# Patient Record
Sex: FEMALE | Race: WHITE | NOT HISPANIC OR LATINO | Employment: FULL TIME | URBAN - METROPOLITAN AREA
[De-identification: names, ages, dates, MRNs, and addresses within clinical notes are randomized per-mention and may not be internally consistent; named-entity substitution may affect disease eponyms.]

---

## 2021-10-07 ENCOUNTER — TELEPHONE (OUTPATIENT)
Dept: RADIOLOGY | Facility: HOSPITAL | Age: 71
End: 2021-10-07

## 2021-10-08 ENCOUNTER — HOSPITAL ENCOUNTER (OUTPATIENT)
Dept: RADIOLOGY | Facility: HOSPITAL | Age: 71
Discharge: HOME | End: 2021-10-08
Attending: INTERNAL MEDICINE

## 2021-10-08 DIAGNOSIS — I25.10 CORONARY ATHEROSCLEROSIS OF NATIVE CORONARY ARTERY: ICD-10-CM

## 2021-10-08 DIAGNOSIS — R03.0 ELEVATED BLOOD PRESSURE READING WITHOUT DIAGNOSIS OF HYPERTENSION: ICD-10-CM

## 2021-10-08 PROCEDURE — 75571 CT HRT W/O DYE W/CA TEST: CPT | Mod: TC

## 2021-10-08 PROCEDURE — 75571 CT HRT W/O DYE W/CA TEST: CPT | Mod: 26,,, | Performed by: RADIOLOGY

## 2021-10-08 PROCEDURE — 75571 CT CALCIUM SCORING CARDIAC: ICD-10-PCS | Mod: 26,,, | Performed by: RADIOLOGY

## 2023-12-28 ENCOUNTER — OFFICE VISIT (OUTPATIENT)
Dept: PULMONOLOGY | Facility: CLINIC | Age: 73
End: 2023-12-28
Payer: MEDICARE

## 2023-12-28 ENCOUNTER — LAB VISIT (OUTPATIENT)
Dept: LAB | Facility: HOSPITAL | Age: 73
End: 2023-12-28
Attending: INTERNAL MEDICINE
Payer: MEDICARE

## 2023-12-28 VITALS
WEIGHT: 159.63 LBS | DIASTOLIC BLOOD PRESSURE: 80 MMHG | OXYGEN SATURATION: 97 % | HEIGHT: 67 IN | BODY MASS INDEX: 25.06 KG/M2 | RESPIRATION RATE: 16 BRPM | HEART RATE: 81 BPM | SYSTOLIC BLOOD PRESSURE: 120 MMHG

## 2023-12-28 DIAGNOSIS — Z86.19 HISTORY OF ASPERGILLOMA: ICD-10-CM

## 2023-12-28 DIAGNOSIS — B44.89 INFECTION BY ASPERGILLUS FUMIGATUS: Primary | ICD-10-CM

## 2023-12-28 DIAGNOSIS — J45.20 MILD INTERMITTENT ASTHMA WITH ALLERGIC RHINITIS WITHOUT COMPLICATION: ICD-10-CM

## 2023-12-28 DIAGNOSIS — J30.89 SEASONAL ALLERGIC RHINITIS DUE TO OTHER ALLERGIC TRIGGER: ICD-10-CM

## 2023-12-28 DIAGNOSIS — B44.9: ICD-10-CM

## 2023-12-28 DIAGNOSIS — B44.89 INFECTION BY ASPERGILLUS FUMIGATUS: ICD-10-CM

## 2023-12-28 LAB
IGA SERPL-MCNC: 217 MG/DL (ref 40–350)
IGE SERPL-ACNC: 56 IU/ML (ref 0–100)
IGG SERPL-MCNC: 944 MG/DL (ref 650–1600)
IGM SERPL-MCNC: 97 MG/DL (ref 50–300)

## 2023-12-28 PROCEDURE — 82787 IGG 1 2 3 OR 4 EACH: CPT | Mod: 59 | Performed by: INTERNAL MEDICINE

## 2023-12-28 PROCEDURE — 99205 OFFICE O/P NEW HI 60 MIN: CPT | Mod: S$PBB,,, | Performed by: INTERNAL MEDICINE

## 2023-12-28 PROCEDURE — 82784 ASSAY IGA/IGD/IGG/IGM EACH: CPT | Mod: 59 | Performed by: INTERNAL MEDICINE

## 2023-12-28 PROCEDURE — 99999 PR PBB SHADOW E&M-EST. PATIENT-LVL IV: ICD-10-PCS | Mod: PBBFAC,,, | Performed by: INTERNAL MEDICINE

## 2023-12-28 PROCEDURE — 99999 PR PBB SHADOW E&M-EST. PATIENT-LVL IV: CPT | Mod: PBBFAC,,, | Performed by: INTERNAL MEDICINE

## 2023-12-28 PROCEDURE — 82785 ASSAY OF IGE: CPT | Performed by: INTERNAL MEDICINE

## 2023-12-28 PROCEDURE — 99214 OFFICE O/P EST MOD 30 MIN: CPT | Mod: PBBFAC | Performed by: INTERNAL MEDICINE

## 2023-12-28 PROCEDURE — 82784 ASSAY IGA/IGD/IGG/IGM EACH: CPT | Performed by: INTERNAL MEDICINE

## 2023-12-28 PROCEDURE — 99205 PR OFFICE/OUTPT VISIT, NEW, LEVL V, 60-74 MIN: ICD-10-PCS | Mod: S$PBB,,, | Performed by: INTERNAL MEDICINE

## 2023-12-28 RX ORDER — TAMSULOSIN HYDROCHLORIDE 0.4 MG/1
1 CAPSULE ORAL NIGHTLY
COMMUNITY
Start: 2023-09-19

## 2023-12-28 RX ORDER — DESLORATADINE 5 MG/1
5 TABLET ORAL
COMMUNITY

## 2023-12-28 RX ORDER — MONTELUKAST SODIUM 10 MG/1
10 TABLET ORAL
COMMUNITY
Start: 2023-12-11

## 2023-12-28 NOTE — PROGRESS NOTES
Subjective:     Patient ID: Pat Damon is a 73 y.o. female.    Chief Complaint:      HPI    74 y/o with history of allergic rhinitis and asthma. Last seen over 9 years ago.  Recent 4/2023 surgery with aspergillus fungus ball in sinus removed via ENT  Noted to have tests in urine as well      Asthma Follow-up  The patient has previously been evaluated here for asthma and presents for an asthma follow-up. The patient is not currently have symptoms / an exacerbation. The patient has been having episodes for approximately  many  years. Symptoms in previous episodes have included dyspnea, non-productive cough, and wheezing, and typically last 2 weeks. Previous episodes have been triggered by no identifiable factor. Treatments tried during prior episodes include short-acting inhaled beta-adrenergic agonists, which usually provides some relief of symptoms.    Current Disease Severity  The patient is having daytime symptoms less than or equal to 2 days per week. The patient is having daytime symptoms less than or equal to 2 times per month. The patient is using short-acting beta agonists for symptom control less than or equal to 2 days per week. She has exacerbations requiring oral systemic corticosteroids 0 times per year. Current limitations in activity from asthma: none. Number of days of school or work missed in the last month: not applicable. Number of urgent/emergent visit in last year: 0.  The patient is not using a spacer with MDIs. Her best peak flow rate is chris. She is not monitoring peak flow rates at home.  Side effects to oral steroids  - prednisone medrol      Past Medical History:   Diagnosis Date    Asthma in remission     Breast cancer 2014    Essential (primary) hypertension     Hypercholesterolemia     Melanoma     Migraines     Pulsatile tinnitus     SVT (supraventricular tachycardia)      Past Surgical History:   Procedure Laterality Date    BREAST LUMPECTOMY      CHOLECYSTECTOMY      EXCISION  OF MELANOMA      TONSILLECTOMY      TURBINATE RESECTION       Review of patient's allergies indicates:   Allergen Reactions    Molds extract     Phenytoin sodium extended      Other reaction(s): tachycardia  Other reaction(s): tachycardia    Pollens extract      Current Outpatient Medications on File Prior to Visit   Medication Sig Dispense Refill    ascorbic acid, vitamin C, (VITAMIN C) 1000 MG tablet Take 1,000 mg by mouth.      aspirin (ECOTRIN) 81 MG EC tablet Take 1 tablet by mouth every morning.      butalbital-acetaminophen-caff -40 mg Cap       desloratadine (CLARINEX) 5 mg tablet Take 5 mg by mouth.      famotidine (PEPCID) 40 MG tablet Take 40 mg by mouth 2 (two) times daily.      metoprolol tartrate (LOPRESSOR) 25 MG tablet       montelukast (SINGULAIR) 10 mg tablet Take 10 mg by mouth.      multivitamin with folic acid 400 mcg Tab Take 1 tablet by mouth once daily.      omega 3-dha-epa-fish oil (OMEGA-3) 350 mg-235 mg- 90 mg-597 mg CpDR       phytonadione, vit K1, (PHYTONADIONE, VITAMIN K1,) 100 mcg Tab Take 100 mcg by mouth.      rosuvastatin (CRESTOR) 5 MG tablet       tamsulosin (FLOMAX) 0.4 mg Cap Take 1 capsule by mouth every evening.      ZENPEP 40,000-126,000- 168,000 unit CpDR SMARTSI Capsule(s) By Mouth      [DISCONTINUED] omeprazole (PRILOSEC) 20 MG capsule Take 20 mg by mouth.       No current facility-administered medications on file prior to visit.     Social History     Socioeconomic History    Marital status: Single   Tobacco Use    Smoking status: Never    Smokeless tobacco: Never   Substance and Sexual Activity    Alcohol use: Not Currently     Alcohol/week: 1.0 standard drink of alcohol     Types: 1 Glasses of wine per week    Drug use: No    Sexual activity: Not Currently     Family History   Problem Relation Age of Onset    Hypertension Mother     Heart disease Mother        Review of Systems   Constitutional:  Negative for fever and fatigue.   HENT:  Positive for postnasal  "drip and congestion. Negative for rhinorrhea.    Eyes:  Negative for redness and itching.   Respiratory:  Negative for cough, shortness of breath, wheezing, dyspnea on extertion and Paroxysmal Nocturnal Dyspnea.    Cardiovascular:  Negative for chest pain.   Genitourinary:  Negative for difficulty urinating and hematuria.   Endocrine:  Negative for polyphagia, cold intolerance and heat intolerance.    Musculoskeletal:  Negative for arthralgias.   Skin:  Negative for rash.   Gastrointestinal:  Negative for nausea, vomiting, abdominal pain and abdominal distention.   Neurological:  Negative for dizziness and headaches.   Hematological:  Negative for adenopathy. Does not bruise/bleed easily and no excessive bruising.   Psychiatric/Behavioral:  The patient is not nervous/anxious.        Objective:      /80   Pulse 81   Resp 16   Ht 5' 7" (1.702 m)   Wt 72.4 kg (159 lb 9.8 oz)   SpO2 97%   BMI 25.00 kg/m²   Physical Exam  Vitals and nursing note reviewed.   Constitutional:       Appearance: Normal appearance. She is well-developed.   HENT:      Head: Normocephalic and atraumatic.      Nose: Nose normal.   Eyes:      Conjunctiva/sclera: Conjunctivae normal.      Pupils: Pupils are equal, round, and reactive to light.   Neck:      Thyroid: No thyromegaly.      Vascular: No JVD.      Trachea: No tracheal deviation.   Cardiovascular:      Rate and Rhythm: Normal rate and regular rhythm.      Heart sounds: Normal heart sounds.   Pulmonary:      Effort: Pulmonary effort is normal. No respiratory distress.      Breath sounds: Normal breath sounds. No wheezing or rales.   Chest:      Chest wall: No tenderness.   Abdominal:      General: Bowel sounds are normal.      Palpations: Abdomen is soft.   Musculoskeletal:         General: Normal range of motion.      Cervical back: Neck supple.   Lymphadenopathy:      Cervical: No cervical adenopathy.   Skin:     General: Skin is warm and dry.   Neurological:      Mental " "Status: She is alert and oriented to person, place, and time.       Personal Diagnostic Review  none pertinent        12/28/2023     2:43 PM   Pulmonary Studies Review   SpO2 97 %   Height 5' 7" (1.702 m)   Weight 72.4 kg (159 lb 9.8 oz)   BMI (Calculated) 25   Predicted Distance 296.41   Predicted Distance Meters (Calculated) 438.39 meters       CT Cardiac Scoring  Narrative: EXAMINATION:  CT CALCIUM SCORING CARDIAC    CLINICAL HISTORY:  Atherosclerotic heart disease of native coronary artery without angina pectoris    TECHNIQUE:  Gated,  low dose axial images were performed over the heart.    COMPARISON:  Chest x-ray from 01/08/2014    FINDINGS:  Important information about your scan:    The following information is based on analysis of the coronary arteries only.  Calcium deposits do not correspond directly to the percentage of narrowing of the arteries.  They do correlate directly to the amount of coronary plaque, and to the risk of future coronary disease.  These calcium deposits usually begin to form years before any symptoms develop.  Early detection and modification of risk factors, such as smoking and cholesterol intake, and slow progression of coronary artery disease.    A low score suggests a low likelihood of coronary artery disease, but does not exclude the possibility of significant coronary artery narrowing.  The results should be discussed with your physician taking into account other risk factors such as age, gender, family history, diabetes, smoking or high cholesterol levels.    Should you experience chest pain, difficulty breathing, discomfort radiating into her neck or arm, or discomfort combined with lightheadedness, sweating, fainting or nausea, you should seek prompt medical attention.    Calcium score:    0-10: Very little coronary heart disease risk    11 through 100: Low to moderate coronary heart disease risk    101 through 400: Moderate to high coronary heart disease risk    Greater than " "401: High coronary heart disease risk.    SCORE SUMMARY    Your total calcium score is 190.  This places the patient in the 60th percentile rank.  This means 40% of females between 71 and 75 will have a higher calcium score.    Incidental findings: Partially imaged bilateral breast implants.  Calcified plaque in the thoracic and abdominal aorta.  Degenerative changes of the spine.  Impression: Your total calcium score is 190.  Incidental findings as above.    Electronically signed by: Mann Grimm MD  Date:    10/08/2021  Time:    15:13      Office Spirometry Results:         12/28/2023     2:43 PM 3/20/2023     2:59 PM 9/26/2022     3:37 PM 5/12/2022     3:37 PM 12/16/2021     2:11 PM 1/8/2014    12:06 PM 12/13/2012     9:25 AM   Pulmonary Function Tests   SpO2 97 %     98 %    Height 5' 7" (1.702 m) 5' 7" (1.702 m) 5' 7" (1.702 m) 5' 7" (1.702 m) 5' 7" (1.702 m) 5' 7" (1.702 m) 5' 7" (1.702 m)   Weight 72.4 kg (159 lb 9.8 oz) 71.7 kg (158 lb) 72.6 kg (160 lb) 73.5 kg (162 lb) 71.7 kg (158 lb) 70.5 kg (155 lb 8 oz) 75.8 kg (167 lb 1.6 oz)   BMI (Calculated) 25 24.7 25.1 25.4 24.7 24.4 26.2         12/28/2023     2:43 PM   Pulmonary Studies Review   SpO2 97 %   Height 5' 7" (1.702 m)   Weight 72.4 kg (159 lb 9.8 oz)   BMI (Calculated) 25   Predicted Distance 296.41   Predicted Distance Meters (Calculated) 438.39 meters         Assessment:            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  -     Aspergillus Antigen; Future; Expected date: 12/28/2023  -     Fungal Precipitins (Hypersensitivity PneumonitisI); Future; Expected date: 12/28/2023  -     CT Chest Without Contrast; Future; Expected date: 12/28/2023  -     IgG 1, 2, 3, and 4; Future; Expected date: 12/28/2023  -     IgE; Future; Expected date: 12/28/2023  -     IGA; Future; Expected date: 12/28/2023  -     IgG; Future; Expected date: 12/28/2023  -     IgM; Future; Expected date: 12/28/2023  -     Fungitell Assay For (1.3)-B-D-Glucans; Future; Expected date: " 2023    Intracavitary aspergillus fungus ball    History of aspergilloma - sinus cavity  -     Aspergillus Antigen; Future; Expected date: 2023  -     Fungal Precipitins (Hypersensitivity PneumonitisI); Future; Expected date: 2023  -     Fungitell Assay For (1.3)-B-D-Glucans; Future; Expected date: 2023    Seasonal allergic rhinitis due to other allergic trigger    Mild intermittent asthma with allergic rhinitis without complication          Outpatient Encounter Medications as of 2023   Medication Sig Dispense Refill    ascorbic acid, vitamin C, (VITAMIN C) 1000 MG tablet Take 1,000 mg by mouth.      aspirin (ECOTRIN) 81 MG EC tablet Take 1 tablet by mouth every morning.      butalbital-acetaminophen-caff -40 mg Cap       desloratadine (CLARINEX) 5 mg tablet Take 5 mg by mouth.      famotidine (PEPCID) 40 MG tablet Take 40 mg by mouth 2 (two) times daily.      metoprolol tartrate (LOPRESSOR) 25 MG tablet       montelukast (SINGULAIR) 10 mg tablet Take 10 mg by mouth.      multivitamin with folic acid 400 mcg Tab Take 1 tablet by mouth once daily.      omega 3-dha-epa-fish oil (OMEGA-3) 350 mg-235 mg- 90 mg-597 mg CpDR       phytonadione, vit K1, (PHYTONADIONE, VITAMIN K1,) 100 mcg Tab Take 100 mcg by mouth.      rosuvastatin (CRESTOR) 5 MG tablet       tamsulosin (FLOMAX) 0.4 mg Cap Take 1 capsule by mouth every evening.      ZENPEP 40,000-126,000- 168,000 unit CpDR SMARTSI Capsule(s) By Mouth      [DISCONTINUED] omeprazole (PRILOSEC) 20 MG capsule Take 20 mg by mouth.       No facility-administered encounter medications on file as of 2023.     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    MEDICAL DECISION MAKING: Moderate to high complexity.  Overall, the multiple problems listed are of moderate to high severity that may impact quality of life and activities of daily living. Side effects of medications, treatment plan as well as options and alternatives reviewed and discussed with patient. There was counseling of patient concerning these issues.    Total time spent in counseling and coordination of care - 45  minutes of total time spent on the encounter, which includes face to face time and non-face to face time preparing to see the patient (eg, review of tests), Obtaining and/or reviewing separately obtained history, Documenting clinical information in the electronic or other health record, Independently interpreting results (not separately reported) and communicating results to the patient/family/caregiver, or Care coordination (not separately reported).    Time was used in discussion of prognosis, risks, benefits of treatment, instructions and compliance with regimen . Discussion with other physicians and/or health care providers - home health or for use of durable medical equipment (oxygen, nebulizers, CPAP, BiPAP) occurred.

## 2024-01-01 LAB
1,3 BETA GLUCAN SER-MCNC: <31 PG/ML
FUNGITELL COMMENTS: NEGATIVE

## 2024-01-02 LAB
GALACTOMANNAN AG SERPL IA-ACNC: <0.5 INDEX
IGG1 SER-MCNC: 448 MG/DL (ref 382–929)
IGG2 SER-MCNC: 377 MG/DL (ref 242–700)
IGG3 SER-MCNC: 30 MG/DL (ref 22–176)
IGG4 SER-MCNC: 55 MG/DL (ref 4–86)

## 2024-01-08 ENCOUNTER — TELEPHONE (OUTPATIENT)
Dept: PULMONOLOGY | Facility: CLINIC | Age: 74
End: 2024-01-08
Payer: MEDICARE

## 2024-01-08 NOTE — TELEPHONE ENCOUNTER
Spoke to pt.  She is concerned about having more radiation from a CT scan since she recently received radiation for breast cancer unless you feel it is necessary.  Please advise.

## 2024-01-08 NOTE — TELEPHONE ENCOUNTER
----- Message from Jerry Orta sent at 1/8/2024 12:29 PM CST -----  Contact: patient  Pat Damon would like a call back at [phone], in regards to checking to see if her Lung CT scan is still needed since all of her labs came back normal.

## 2024-01-10 ENCOUNTER — PATIENT MESSAGE (OUTPATIENT)
Dept: PULMONOLOGY | Facility: CLINIC | Age: 74
End: 2024-01-10
Payer: MEDICARE

## 2024-01-11 NOTE — TELEPHONE ENCOUNTER
Called patient  No pulmonary symptoms  Serologic workup negative  No need for CT of chest  Follow up as needed     Latest Reference Range & Units 12/28/23 15:56   Aspergillus Antigen <0.5 index <0.500   Fungitell Assay <60 pg/mL pg/mL <31   Fungitell Comments Negative  Negative        Latest Reference Range & Units 12/28/23 15:56   IgG 650 - 1600 mg/dL 944   IgM 50 - 300 mg/dL 97   IgA 40 - 350 mg/dL 217   IgE 0 - 100 IU/mL 56   IgG 1 382 - 929 mg/dL 448   IgG 2 242 - 700 mg/dL 377   IgG 3 22 - 176 mg/dL 30   IgG 4 4 - 86 mg/dL 55

## 2024-04-18 ENCOUNTER — LAB VISIT (OUTPATIENT)
Dept: LAB | Facility: HOSPITAL | Age: 74
End: 2024-04-18
Attending: STUDENT IN AN ORGANIZED HEALTH CARE EDUCATION/TRAINING PROGRAM
Payer: MEDICARE

## 2024-04-18 ENCOUNTER — OFFICE VISIT (OUTPATIENT)
Dept: ALLERGY | Facility: CLINIC | Age: 74
End: 2024-04-18
Payer: MEDICARE

## 2024-04-18 VITALS
BODY MASS INDEX: 24.47 KG/M2 | DIASTOLIC BLOOD PRESSURE: 81 MMHG | HEIGHT: 67 IN | HEART RATE: 71 BPM | WEIGHT: 155.88 LBS | TEMPERATURE: 98 F | SYSTOLIC BLOOD PRESSURE: 129 MMHG | OXYGEN SATURATION: 98 %

## 2024-04-18 DIAGNOSIS — R23.2 FLUSHING: ICD-10-CM

## 2024-04-18 DIAGNOSIS — R14.0 ABDOMINAL BLOATING: ICD-10-CM

## 2024-04-18 DIAGNOSIS — J45.20 MILD INTERMITTENT ASTHMA WITHOUT COMPLICATION: ICD-10-CM

## 2024-04-18 DIAGNOSIS — D89.40 MAST CELL ACTIVATION: ICD-10-CM

## 2024-04-18 DIAGNOSIS — J30.9 CHRONIC ALLERGIC RHINITIS: Primary | ICD-10-CM

## 2024-04-18 LAB
BASOPHILS # BLD AUTO: 0.06 K/UL (ref 0–0.2)
BASOPHILS NFR BLD: 0.9 % (ref 0–1.9)
DIFFERENTIAL METHOD BLD: ABNORMAL
EOSINOPHIL # BLD AUTO: 0 K/UL (ref 0–0.5)
EOSINOPHIL NFR BLD: 0.6 % (ref 0–8)
ERYTHROCYTE [DISTWIDTH] IN BLOOD BY AUTOMATED COUNT: 12.3 % (ref 11.5–14.5)
HCT VFR BLD AUTO: 43.7 % (ref 37–48.5)
HGB BLD-MCNC: 13.4 G/DL (ref 12–16)
IMM GRANULOCYTES # BLD AUTO: 0.01 K/UL (ref 0–0.04)
IMM GRANULOCYTES NFR BLD AUTO: 0.1 % (ref 0–0.5)
LYMPHOCYTES # BLD AUTO: 2.1 K/UL (ref 1–4.8)
LYMPHOCYTES NFR BLD: 30.3 % (ref 18–48)
MCH RBC QN AUTO: 30.6 PG (ref 27–31)
MCHC RBC AUTO-ENTMCNC: 30.7 G/DL (ref 32–36)
MCV RBC AUTO: 100 FL (ref 82–98)
MONOCYTES # BLD AUTO: 0.9 K/UL (ref 0.3–1)
MONOCYTES NFR BLD: 13.1 % (ref 4–15)
NEUTROPHILS # BLD AUTO: 3.9 K/UL (ref 1.8–7.7)
NEUTROPHILS NFR BLD: 55 % (ref 38–73)
NRBC BLD-RTO: 0 /100 WBC
PLATELET # BLD AUTO: 229 K/UL (ref 150–450)
PMV BLD AUTO: 10.1 FL (ref 9.2–12.9)
RBC # BLD AUTO: 4.38 M/UL (ref 4–5.4)
WBC # BLD AUTO: 7.02 K/UL (ref 3.9–12.7)

## 2024-04-18 PROCEDURE — 99213 OFFICE O/P EST LOW 20 MIN: CPT | Mod: PBBFAC | Performed by: STUDENT IN AN ORGANIZED HEALTH CARE EDUCATION/TRAINING PROGRAM

## 2024-04-18 PROCEDURE — 85025 COMPLETE CBC W/AUTO DIFF WBC: CPT | Performed by: STUDENT IN AN ORGANIZED HEALTH CARE EDUCATION/TRAINING PROGRAM

## 2024-04-18 PROCEDURE — 99204 OFFICE O/P NEW MOD 45 MIN: CPT | Mod: S$PBB,,, | Performed by: STUDENT IN AN ORGANIZED HEALTH CARE EDUCATION/TRAINING PROGRAM

## 2024-04-18 PROCEDURE — 36415 COLL VENOUS BLD VENIPUNCTURE: CPT | Performed by: STUDENT IN AN ORGANIZED HEALTH CARE EDUCATION/TRAINING PROGRAM

## 2024-04-18 PROCEDURE — 99999 PR PBB SHADOW E&M-EST. PATIENT-LVL III: CPT | Mod: PBBFAC,,, | Performed by: STUDENT IN AN ORGANIZED HEALTH CARE EDUCATION/TRAINING PROGRAM

## 2024-04-18 PROCEDURE — 82785 ASSAY OF IGE: CPT | Performed by: STUDENT IN AN ORGANIZED HEALTH CARE EDUCATION/TRAINING PROGRAM

## 2024-04-18 RX ORDER — MONTELUKAST SODIUM 10 MG/1
10 TABLET ORAL DAILY
Qty: 90 TABLET | Refills: 3 | Status: SHIPPED | OUTPATIENT
Start: 2024-04-18 | End: 2025-04-18

## 2024-04-18 NOTE — ASSESSMENT & PLAN NOTE
- Well controlled, no acute complaints  - Adding Singular 10 mg nightly at this time   - Educated on proper use of inhalers including an in-person demonstration of proper technique  - Expressed understanding of demonstrated technique  - ED precautions discussed at length   - Will continue to monitor and reassess

## 2024-04-18 NOTE — ASSESSMENT & PLAN NOTE
- Patient presenting for rule out given flushing and abdominal issues   - Ordering Tryptase and D816V mutation   - Recommended low FODMAP diet at this time   - Will continue to monitor and reassess

## 2024-04-18 NOTE — PROGRESS NOTES
Allergy and Immunology  New Patient Clinic Note    Date: 4/18/2024  Chief Complaint   Patient presents with    Allergies     Referred by: Self, Aaareferral  No address on file    History  Pat Damon is a 73 y.o. female being seen as a New Patient today.    Chronic Allergic Rhinitis   - Onset: At least early adulthood   - Symptoms: Congestion, rhinorrhea, PND, congestion   - Suspected triggers include: Serum IgE positive to cat and dust mites on OSH labs  - Prior AIT in the past   - Pattern: Perennial   - Medications: Supplements and Clarinex     Chronic or Inducible Urticaria  - No hx of chronic urticaria     Mild Intermittent Asthma   - Well controlled with no acute issues     CRSwNP  - No hx of CRSwNP - no hx of polyps   - Patient with hx of fungal ball in sinus   - Hx of surgical resection of aspergillus fungal ball     Eczema   - No hx of eczema     Eosinophilic Esophagitis  - No hx of eosinophilic esophagitis     Food Allergy  - No hx of food allergy  - Low serum IgE positive to sesame seed but no hx of anaphylaxis   - Patient reported eating hummus     Recurrent Infections  - No hx of recurrent infections     Venom Allergy  - No hx of venom allergy     Allergies, PMH, PSH, Social, and Family History were reviewed.    Review of patient's allergies indicates:   Allergen Reactions    Molds extract     Phenytoin sodium extended      Other reaction(s): tachycardia  Other reaction(s): tachycardia    Pollens extract       Past Medical History:   Diagnosis Date    Asthma in remission     Breast cancer 2014    Essential (primary) hypertension     Hypercholesterolemia     Melanoma     Migraines     Pulsatile tinnitus     SVT (supraventricular tachycardia)      Past Surgical History:   Procedure Laterality Date    BREAST LUMPECTOMY      CHOLECYSTECTOMY      EXCISION OF MELANOMA      TONSILLECTOMY      TURBINATE RESECTION       Social History     Social History Narrative    Not on file     S/he reports that she  has never smoked. She has never used smokeless tobacco. She reports that she does not currently use alcohol after a past usage of about 1.0 standard drink of alcohol per week. She reports that she does not use drugs.    Current Outpatient Medications on File Prior to Visit   Medication Sig Dispense Refill    ascorbic acid, vitamin C, (VITAMIN C) 1000 MG tablet Take 1,000 mg by mouth.      aspirin (ECOTRIN) 81 MG EC tablet Take 1 tablet by mouth every morning.      butalbital-acetaminophen-caff -40 mg Cap       desloratadine (CLARINEX) 5 mg tablet Take 5 mg by mouth.      famotidine (PEPCID) 40 MG tablet Take 40 mg by mouth 2 (two) times daily.      metoprolol tartrate (LOPRESSOR) 25 MG tablet       multivitamin with folic acid 400 mcg Tab Take 1 tablet by mouth once daily.      omega 3-dha-epa-fish oil (OMEGA-3) 350 mg-235 mg- 90 mg-597 mg CpDR       phytonadione, vit K1, (PHYTONADIONE, VITAMIN K1,) 100 mcg Tab Take 100 mcg by mouth.      rosuvastatin (CRESTOR) 5 MG tablet       tamsulosin (FLOMAX) 0.4 mg Cap Take 1 capsule by mouth every evening.      ZENPEP 40,000-126,000- 168,000 unit CpDR SMARTSI Capsule(s) By Mouth      [DISCONTINUED] montelukast (SINGULAIR) 10 mg tablet Take 10 mg by mouth.       No current facility-administered medications on file prior to visit.     Physical Examination  Vitals:    24 1518   BP: 129/81   Pulse: 71   Temp: 97.9 °F (36.6 °C)     GENERAL:  female in no apparent distress and well developed and well nourished  HEAD:  Normocephalic, without obvious abnormality, atraumatic  EYES: sclera anicteric, conjunctiva normochromic  EARS: normal TM's and external ear canals both ears  NOSE: without erythema or discharge, clear discharge, turbinates normal    OROPHARYNX: moist mucous membranes without erythema, exudates or petechiae  LYMPH NODES: normal, supple, no lymphadenopathy  LUNGS: clear to auscultation, no wheezes, rales or rhonchi, symmetric air entry.  HEART: normal  rate, regular rhythm, normal S1, S2, no murmurs, rubs, clicks or gallops.  ABDOMEN: soft, nontender, nondistended, no masses or organomegaly.  MUSCULOSKELETAL: no gross joint deformity or swelling.  NEURO: alert, oriented, normal speech, no focal findings or movement disorder noted.  SKIN: normal coloration and turgor, no rashes, no suspicious skin lesions noted.     Assessment/Plan:   Problem List Items Addressed This Visit          ENT    Chronic allergic rhinitis - Primary       Pulmonary    Mild intermittent asthma without complication    Current Assessment & Plan     - Well controlled, no acute complaints  - Adding Singular 10 mg nightly at this time   - Educated on proper use of inhalers including an in-person demonstration of proper technique  - Expressed understanding of demonstrated technique  - ED precautions discussed at length   - Will continue to monitor and reassess            Immunology/Multi System    Mast cell activation    Current Assessment & Plan     - Patient presenting for rule out given flushing and abdominal issues   - Ordering Tryptase and D816V mutation   - Recommended low FODMAP diet at this time   - Will continue to monitor and reassess          Relevant Orders    Tryptase    KIT Dbf369D Mutation Analysis, Blood    CBC Auto Differential    Allergen Profile, Zone 6       GI    Abdominal bloating       Other    Flushing     Follow up:  Follow up in about 6 weeks (around 5/30/2024).    Marjorie Dick MD   [facility]  Allergy and Immunology

## 2024-04-23 LAB
A ALTERNATA IGE QN: <0.1 KU/L
A FUMIGATUS IGE QN: <0.1 KU/L
ALLERGEN BOXELDER MAPLE TREE IGE: <0.1 KU/L
ALLERGEN MAPLE (BOX ELDER) CLASS: ABNORMAL
ALLERGEN MULBERRY CLASS: ABNORMAL
ALLERGEN MULBERRY TREE IGE: <0.1 KU/L
ALLERGEN PIGWEED IGE: <0.1 KU/L
ALLERGEN WALNUT TREE IGE: <0.1 KU/L
BERMUDA GRASS IGE QN: <0.1 KU/L
C HERBARUM IGE QN: <0.1 KU/L
CAT DANDER IGE QN: 1.25 KU/L
COMMON PIGWEED CLASS: ABNORMAL
COMMON RAGWEED IGE QN: <0.1 KU/L
D FARINAE IGE QN: 0.12 KU/L
D PTERONYSS IGE QN: 0.14 KU/L
DEPRECATED A ALTERNATA IGE RAST QL: ABNORMAL
DEPRECATED A FUMIGATUS IGE RAST QL: ABNORMAL
DEPRECATED BERMUDA GRASS IGE RAST QL: ABNORMAL
DEPRECATED C HERBARUM IGE RAST QL: ABNORMAL
DEPRECATED CAT DANDER IGE RAST QL: ABNORMAL
DEPRECATED COMMON RAGWEED IGE RAST QL: ABNORMAL
DEPRECATED D FARINAE IGE RAST QL: ABNORMAL
DEPRECATED D PTERONYSS IGE RAST QL: ABNORMAL
DEPRECATED DOG DANDER IGE RAST QL: ABNORMAL
DEPRECATED ELDER IGE RAST QL: ABNORMAL
DEPRECATED MOUSE URINE PROT IGE RAST QL: ABNORMAL
DEPRECATED MT JUNIPER IGE RAST QL: ABNORMAL
DEPRECATED P NOTATUM IGE RAST QL: ABNORMAL
DEPRECATED PECAN/HICK TREE IGE RAST QL: ABNORMAL
DEPRECATED ROACH IGE RAST QL: ABNORMAL
DEPRECATED SILVER BIRCH IGE RAST QL: ABNORMAL
DEPRECATED TIMOTHY IGE RAST QL: ABNORMAL
DEPRECATED WHITE ELM IGE RAST QL: ABNORMAL
DEPRECATED WHITE OAK IGE RAST QL: ABNORMAL
DOG DANDER IGE QN: 0.1 KU/L
ELDER IGE QN: <0.1 KU/L
IGE: 13.9 IU/ML
MOUSE URINE PROT IGE QN: <0.1 KU/L
MT JUNIPER IGE QN: <0.1 KU/L
P NOTATUM IGE QN: <0.1 KU/L
PECAN/HICK TREE IGE QN: <0.1 KU/L
RAST ALLERGEN INTERPRETATION: ABNORMAL
ROACH IGE QN: <0.1 KU/L
SILVER BIRCH IGE QN: <0.1 KU/L
TIMOTHY IGE QN: <0.1 KU/L
TRYPTASE LEVEL: 2.8 NG/ML
WALNUT TREE CLASS: ABNORMAL
WHITE ELM IGE QN: <0.1 KU/L
WHITE OAK IGE QN: <0.1 KU/L

## 2024-04-25 LAB
GENETICIST REVIEW: NORMAL
PATHOLOGIST NAME: NORMAL
SPECIMEN SOURCE: NORMAL

## 2024-04-29 ENCOUNTER — TELEPHONE (OUTPATIENT)
Dept: HEPATOLOGY | Facility: CLINIC | Age: 74
End: 2024-04-29
Payer: MEDICARE

## 2024-04-29 NOTE — TELEPHONE ENCOUNTER
Attempted to contact patient at [phone] with no answer. Left voicemail message for patient to return call.

## 2024-04-29 NOTE — TELEPHONE ENCOUNTER
----- Message from Rena Alicea sent at 4/29/2024 11:15 AM CDT -----  Contact: self  Type:  Patient Returning Call    Who Called: Pat Damon  Who Left Message for Patient:Esperanza  Does the patient know what this is regarding?:scheduling  Would the patient rather a call back or a response via [facility]? Call back  Best Call Back Number:[phone] (home)   Additional Information: n/a

## 2024-04-29 NOTE — TELEPHONE ENCOUNTER
----- Message from Chantelle Pina sent at 4/26/2024  3:27 PM CDT -----  Contact: Pat Suh is calling to receive a call back at [phone], regarding scheduling. Reports being diagnosed with fatty liver disease and would like to establish with hepatology.     No appts populated on epic

## 2024-04-29 NOTE — TELEPHONE ENCOUNTER
Spoke with patient and scheduled an appointment for 08/12/2024 with Dr. Dianna Platt at the [facility]. Patient to have additional records faxed to our office.

## 2024-06-07 ENCOUNTER — TELEPHONE (OUTPATIENT)
Dept: ALLERGY | Facility: CLINIC | Age: 74
End: 2024-06-07
Payer: MEDICARE

## 2024-06-07 ENCOUNTER — OFFICE VISIT (OUTPATIENT)
Dept: ALLERGY | Facility: CLINIC | Age: 74
End: 2024-06-07
Payer: MEDICARE

## 2024-06-07 VITALS
SYSTOLIC BLOOD PRESSURE: 107 MMHG | HEIGHT: 67 IN | BODY MASS INDEX: 24.85 KG/M2 | WEIGHT: 158.31 LBS | HEART RATE: 64 BPM | DIASTOLIC BLOOD PRESSURE: 66 MMHG

## 2024-06-07 DIAGNOSIS — J30.89 ALLERGIC RHINITIS DUE TO DUST MITE: Primary | ICD-10-CM

## 2024-06-07 DIAGNOSIS — D89.40 MAST CELL ACTIVATION: ICD-10-CM

## 2024-06-07 DIAGNOSIS — J45.20 MILD INTERMITTENT ASTHMA WITHOUT COMPLICATION: ICD-10-CM

## 2024-06-07 DIAGNOSIS — R14.0 ABDOMINAL BLOATING: ICD-10-CM

## 2024-06-07 DIAGNOSIS — J30.81 ALLERGIC RHINITIS DUE TO ANIMAL DANDER: ICD-10-CM

## 2024-06-07 PROBLEM — J30.9 CHRONIC ALLERGIC RHINITIS: Status: RESOLVED | Noted: 2024-04-18 | Resolved: 2024-06-07

## 2024-06-07 PROCEDURE — 99214 OFFICE O/P EST MOD 30 MIN: CPT | Mod: S$PBB,,, | Performed by: STUDENT IN AN ORGANIZED HEALTH CARE EDUCATION/TRAINING PROGRAM

## 2024-06-07 PROCEDURE — 99213 OFFICE O/P EST LOW 20 MIN: CPT | Mod: PBBFAC | Performed by: STUDENT IN AN ORGANIZED HEALTH CARE EDUCATION/TRAINING PROGRAM

## 2024-06-07 PROCEDURE — 99999 PR PBB SHADOW E&M-EST. PATIENT-LVL III: CPT | Mod: PBBFAC,,, | Performed by: STUDENT IN AN ORGANIZED HEALTH CARE EDUCATION/TRAINING PROGRAM

## 2024-06-07 PROCEDURE — G2211 COMPLEX E/M VISIT ADD ON: HCPCS | Mod: S$PBB,,, | Performed by: STUDENT IN AN ORGANIZED HEALTH CARE EDUCATION/TRAINING PROGRAM

## 2024-06-07 NOTE — TELEPHONE ENCOUNTER
----- Message from Petty Machuca MA sent at 6/7/2024  1:55 PM CDT -----    ----- Message -----  From: Kristina Nieto  Sent: 6/7/2024   1:53 PM CDT  To: [facility] Staff    Patient would like to know if there is an earlier time slot. Call back number is [phone]. Thx.EL

## 2024-06-07 NOTE — PROGRESS NOTES
Allergy and Immunology  Established Patient Clinic Note    Date: 6/7/2024  Chief Complaint   Patient presents with    Allergies     History  Pat Damon is a 73 y.o. female being seen for follow-up today.    Chronic Allergic Rhinitis due to cat and dust mites  Mast Cell Activation    - 1x attack when helping son pack his house   - Son has 2 cats in his house   - Patient has 1 cat in her house - cat on Fel d 1 lowering food as recommended   - Continue current medications  - Normal tryptase, no D816V mutation - reassurance at this time   - Patient appears have benefit from low histamine diet and to continue   - Patient with plan to switch from Clarinex to Allegra       CRSwNP  - No hx of CRSwNP - no hx of polyps   - Patient with hx of fungal ball in sinus   - Hx of surgical resection of aspergillus fungal ball      Food Allergy  - No hx of food allergy  - Low serum IgE positive to sesame seed but no hx of anaphylaxis   - Patient reported eating hummus    Allergies, PMH, PSH, Social, and Family History were reviewed.    Current Outpatient Medications on File Prior to Visit   Medication Sig Dispense Refill    ascorbic acid, vitamin C, (VITAMIN C) 1000 MG tablet Take 1,000 mg by mouth.      aspirin (ECOTRIN) 81 MG EC tablet Take 1 tablet by mouth every morning.      butalbital-acetaminophen-caff -40 mg Cap       desloratadine (CLARINEX) 5 mg tablet Take 5 mg by mouth.      famotidine (PEPCID) 40 MG tablet Take 40 mg by mouth 2 (two) times daily.      metoprolol tartrate (LOPRESSOR) 25 MG tablet       montelukast (SINGULAIR) 10 mg tablet Take 1 tablet (10 mg total) by mouth once daily. 90 tablet 3    multivitamin with folic acid 400 mcg Tab Take 1 tablet by mouth once daily.      omega 3-dha-epa-fish oil (OMEGA-3) 350 mg-235 mg- 90 mg-597 mg CpDR       phytonadione, vit K1, (PHYTONADIONE, VITAMIN K1,) 100 mcg Tab Take 100 mcg by mouth.      rosuvastatin (CRESTOR) 5 MG tablet       tamsulosin (FLOMAX) 0.4 mg  Cap Take 1 capsule by mouth every evening.      ZENPEP 40,000-126,000- 168,000 unit CpDR SMARTSI Capsule(s) By Mouth       No current facility-administered medications on file prior to visit.     Physical Examination  Vitals:    24 1520   BP: 107/66   Pulse: 64     GENERAL:  female in no apparent distress and well developed and well nourished  HEAD:  Normocephalic, without obvious abnormality, atraumatic  EYES: sclera anicteric, conjunctiva normochromic  EARS: normal TM's and external ear canals both ears  NOSE: without erythema or discharge, clear discharge, turbinates normal    OROPHARYNX: moist mucous membranes without erythema, exudates or petechiae  LYMPH NODES: normal, supple, no lymphadenopathy  LUNGS: clear to auscultation, no wheezes, rales or rhonchi, symmetric air entry.  HEART: normal rate, regular rhythm, normal S1, S2, no murmurs, rubs, clicks or gallops.  ABDOMEN: soft, nontender, nondistended, no masses or organomegaly.  MUSCULOSKELETAL: no gross joint deformity or swelling.  NEURO: alert, oriented, normal speech, no focal findings or movement disorder noted.  SKIN: normal coloration and turgor, no rashes, no suspicious skin lesions noted.     Assessment/Plan:   Problem List Items Addressed This Visit          ENT    Allergic rhinitis due to dust mite - Primary    Overview     - 2024: Serum IgE to Zone 6 Aeroallergens positive to cat and dust mites         Allergic rhinitis due to animal dander    Overview     - 2024: Serum IgE to Zone 6 Aeroallergens positive to cat and dust mites         Current Assessment & Plan     - 1x flare from cats   - Switching to Allegra at this time   - Considering AIT - would be a great candidate   - Will continue to monitor and reassess             Pulmonary    Mild intermittent asthma without complication       Immunology/Multi System    Mast cell activation    Overview     - Normal tryptase level; no D816V mutation          Current Assessment &  Plan     - Switching from Clarinex to Allegra   - Continue low histamine diet at this time             GI    Abdominal bloating     Follow up:  Follow up in about 2 months (around 8/7/2024).    Visit today included increased complexity associated with the care of the episodic problem rhinitis\ addressed and managing the longitudinal care of the patient due to the serious and/or complex managed problem(s) chronic allergic rhinitis, mast cell disorder, and GI issues.      Marjorie Dick MD   [facility]  Allergy and Immunology

## 2024-06-07 NOTE — TELEPHONE ENCOUNTER
Spoke with pt she would like to come in at 3 today. Told her to come on in and we will work her in.

## 2024-06-07 NOTE — ASSESSMENT & PLAN NOTE
- 1x flare from cats   - Switching to Allegra at this time   - Considering AIT - would be a great candidate   - Will continue to monitor and reassess

## 2024-07-31 ENCOUNTER — PATIENT MESSAGE (OUTPATIENT)
Dept: RESEARCH | Facility: HOSPITAL | Age: 74
End: 2024-07-31
Payer: MEDICARE

## 2024-08-12 ENCOUNTER — OFFICE VISIT (OUTPATIENT)
Dept: HEPATOLOGY | Facility: CLINIC | Age: 74
End: 2024-08-12
Payer: MEDICARE

## 2024-08-12 VITALS
WEIGHT: 157.44 LBS | DIASTOLIC BLOOD PRESSURE: 80 MMHG | SYSTOLIC BLOOD PRESSURE: 135 MMHG | BODY MASS INDEX: 24.71 KG/M2 | HEART RATE: 76 BPM | HEIGHT: 67 IN

## 2024-08-12 DIAGNOSIS — K76.0 METABOLIC DYSFUNCTION-ASSOCIATED STEATOTIC LIVER DISEASE (MASLD): Primary | ICD-10-CM

## 2024-08-12 PROCEDURE — 99213 OFFICE O/P EST LOW 20 MIN: CPT | Mod: PBBFAC | Performed by: INTERNAL MEDICINE

## 2024-08-12 PROCEDURE — 99204 OFFICE O/P NEW MOD 45 MIN: CPT | Mod: S$PBB,,, | Performed by: INTERNAL MEDICINE

## 2024-08-12 PROCEDURE — 99999 PR PBB SHADOW E&M-EST. PATIENT-LVL III: CPT | Mod: PBBFAC,,, | Performed by: INTERNAL MEDICINE

## 2024-08-12 NOTE — PROGRESS NOTES
Subjective:     Pat Damon is here for evaluation of Fatty Liver and Elevated Hepatic Enzymes      HPI  Pat Damon is here for evaluation of fatty liver and abnormal LFTs with discrepancies in data testing.  Of note she has had a FibroScan and ultrasound from around a year and a half ago that were unremarkable but then more recently had an ultrasound that suggested fatty liver and a CT scan that is suggestive possible nodular liver.  The patient has known history of liver disease.  Her liver tests usually are normal.  It has only been 1 isolated increased alkaline phosphatase that I have seen but otherwise unremarkable.  She is not have any issues with obesity or insulin resistance.  No history of heavy alcohol use.  For the most part overall has been in good health and eats well.  She has been anxious about these discrepancies in results.    Outside records reviewed    Ultrasound of the abdomen from April 2024 shows diffuse increased hepatic echogenicity suggesting fatty infiltration.  Mildly dilated common bile duct measuring up to 8 mm similar to prior study not unexpected in the setting of prior cholecystectomy.  Several small echogenic foci in the left kidney possibly representing nonobstructing nephrolithiasis.  No significant sonographic abnormality at the site of the reported pain in the right lower quadrant.    Liver tests usually appear to be normal there was 1 time in the alkaline phosphatase was slightly elevated.     Labs reviewed     US 11/2022  METAVIR score F0-F1   Steatosis grade S0     FibroScan from April 2024 shows F0 to 1; S2 ; kilopascal cut off is 4.1, cap score is 269, IQR 5%    CT scan of the abdomen from May 2024 shows no bile duct dilatation there is a subtle nodular surface of the liver which may indicate cirrhosis.    Unremarkable right upper quadrant ultrasound.     Review of Systems    Objective:     Physical Exam  Vitals reviewed.   Constitutional:       General: She  "is not in acute distress.     Appearance: She is well-developed.   HENT:      Head: Normocephalic and atraumatic.      Mouth/Throat:      Pharynx: No oropharyngeal exudate.   Eyes:      General: No scleral icterus.        Right eye: No discharge.         Left eye: No discharge.      Conjunctiva/sclera: Conjunctivae normal.      Pupils: Pupils are equal, round, and reactive to light.   Pulmonary:      Effort: Pulmonary effort is normal. No respiratory distress.      Breath sounds: No wheezing.   Abdominal:      General: There is no distension.   Musculoskeletal:      Right lower leg: No edema.      Left lower leg: No edema.   Neurological:      Mental Status: She is alert and oriented to person, place, and time.   Psychiatric:         Behavior: Behavior normal.         Computed MELD 3.0 unavailable. One or more values for this score either were not found within the given timeframe or did not fit some other criterion.  Computed MELD-Na unavailable. One or more values for this score either were not found within the given timeframe or did not fit some other criterion.      WBC   Date Value Ref Range Status   04/18/2024 7.02 3.90 - 12.70 K/uL Final     Hemoglobin   Date Value Ref Range Status   04/18/2024 13.4 12.0 - 16.0 g/dL Final     Hematocrit   Date Value Ref Range Status   04/18/2024 43.7 37.0 - 48.5 % Final     Platelets   Date Value Ref Range Status   04/18/2024 229 150 - 450 K/uL Final     No results found for: "BUN", "CREATININE", "GLU", "CALCIUM", "NA", "K", "CL", "MG"  No results found for: "AST", "ALT", "ALKPHOS", "BILITOT", "ALBUMIN"  No results found for: "INR"      Assessment/Plan:     1. Metabolic dysfunction-associated steatotic liver disease (MASLD)      Pat Damon is a 73 y.o. female withFatty Liver and Elevated Hepatic Enzymes    Metabolic dysfunction-associated steatotic liver disease (MASLD)-low concern that patient has any underlying chronic liver disease.  She may have some component of " metabolic associated steatotic liver disease but very low concern for steatohepatitis.  She does not have any significant risk factors, previous imaging and fibroscan were completely unremarkable around a year and a half ago and her liver tests have been normal.  Low concern for cirrhosis of the liver, uncertain what to make of the CT scan but there was no other evidence that remotely suggest any chronic underlying liver disease.  -patient will have CT scan brought to us.  I will have that reviewed along with her ultrasound  -repeat labs and ultrasound in 6 months for monitoring  -if things remain unremarkable at next visit given low concern for any underlying liver disease she may not need any additional liver follow-up  -     US Abdomen Limited; Future; Expected date: 08/12/2024  -     Comprehensive Metabolic Panel; Future; Expected date: 08/12/2024  -     CBC Auto Differential; Future; Expected date: 08/12/2024    Return to clinic in 6 months with preclinic labs and imaging      Dianna Platt MD

## 2024-08-22 ENCOUNTER — TELEPHONE (OUTPATIENT)
Dept: HEPATOLOGY | Facility: CLINIC | Age: 74
End: 2024-08-22
Payer: MEDICARE

## 2024-08-23 NOTE — TELEPHONE ENCOUNTER
----- Message from Jorden Harris MD sent at 8/22/2024  3:43 PM CDT -----  Maybe a little fatty but otherwise no  ----- Message -----  From: Dianna Platt MD  Sent: 8/22/2024   2:16 PM CDT  To: Jorden Harris MD    Hey, can you look at her CT scan and see if there is concerns for cirrhosis on imaging. The report suggested cirrhosis but patient w/o other supporting evidence  ----- Message -----  From: Cole Hester MA  Sent: 8/22/2024   9:47 AM CDT  To: Dianna Platt MD    Imaging has been uploaded.

## 2024-08-23 NOTE — TELEPHONE ENCOUNTER
Please let patient know that imaging suggest some fatty liver but there is no evidence of cirrhosis.

## 2024-10-08 ENCOUNTER — OFFICE VISIT (OUTPATIENT)
Dept: ALLERGY | Facility: CLINIC | Age: 74
End: 2024-10-08
Payer: MEDICARE

## 2024-10-08 VITALS
BODY MASS INDEX: 25.28 KG/M2 | HEART RATE: 71 BPM | SYSTOLIC BLOOD PRESSURE: 117 MMHG | TEMPERATURE: 98 F | WEIGHT: 161.38 LBS | DIASTOLIC BLOOD PRESSURE: 69 MMHG

## 2024-10-08 DIAGNOSIS — J30.89 ALLERGIC RHINITIS DUE TO DUST MITE: Primary | ICD-10-CM

## 2024-10-08 DIAGNOSIS — D89.40 MAST CELL ACTIVATION: ICD-10-CM

## 2024-10-08 DIAGNOSIS — J30.81 ALLERGIC RHINITIS DUE TO ANIMAL DANDER: ICD-10-CM

## 2024-10-08 PROCEDURE — 99213 OFFICE O/P EST LOW 20 MIN: CPT | Mod: PBBFAC | Performed by: STUDENT IN AN ORGANIZED HEALTH CARE EDUCATION/TRAINING PROGRAM

## 2024-10-08 PROCEDURE — 99999 PR PBB SHADOW E&M-EST. PATIENT-LVL III: CPT | Mod: PBBFAC,,, | Performed by: STUDENT IN AN ORGANIZED HEALTH CARE EDUCATION/TRAINING PROGRAM

## 2024-10-08 PROCEDURE — 99215 OFFICE O/P EST HI 40 MIN: CPT | Mod: S$PBB,,, | Performed by: STUDENT IN AN ORGANIZED HEALTH CARE EDUCATION/TRAINING PROGRAM

## 2024-10-08 RX ORDER — DESLORATADINE 5 MG/1
5 TABLET ORAL 2 TIMES DAILY
Qty: 60 TABLET | Refills: 11 | Status: SHIPPED | OUTPATIENT
Start: 2024-10-08 | End: 2025-10-08

## 2024-10-08 NOTE — PROGRESS NOTES
Allergy and Immunology  Established Patient Clinic Note    Date: 10/10/2024  Chief Complaint   Patient presents with    Follow-up     History  Pat Damon is a 74 y.o. female being seen for follow-up today.    Chronic Allergic Rhinitis due to cat and dust mites  Mast Cell Activation    - Normal tryptase, no D816V mutation - reassurance at this time   - Patient with interval improvement since starting Clarinex BID   - Continue medications at this time       CRSwNP  - No hx of CRSwNP - no hx of polyps   - Patient with hx of fungal ball in sinus   - Hx of surgical resection of aspergillus fungal ball      Food Allergy  - No hx of food allergy  - Low serum IgE positive to sesame seed but no hx of anaphylaxis   - Patient reported eating hummus    Other   - Patient with NAFLD without cirrhosis   - Spent additional time speaking with patient and interpreting outside medical records       Allergies, PMH, PSH, Social, and Family History were reviewed.    Current Outpatient Medications on File Prior to Visit   Medication Sig Dispense Refill    ascorbic acid, vitamin C, (VITAMIN C) 1000 MG tablet Take 1,000 mg by mouth.      aspirin (ECOTRIN) 81 MG EC tablet Take 1 tablet by mouth every morning.      butalbital-acetaminophen-caff -40 mg Cap       metoprolol tartrate (LOPRESSOR) 25 MG tablet       multivitamin with folic acid 400 mcg Tab Take 1 tablet by mouth once daily.      omega 3-dha-epa-fish oil (OMEGA-3) 350 mg-235 mg- 90 mg-597 mg CpDR       rosuvastatin (CRESTOR) 5 MG tablet       famotidine (PEPCID) 40 MG tablet Take 40 mg by mouth 2 (two) times daily. (Patient not taking: Reported on 10/8/2024)      montelukast (SINGULAIR) 10 mg tablet Take 1 tablet (10 mg total) by mouth once daily. (Patient not taking: Reported on 10/8/2024) 90 tablet 3    phytonadione, vit K1, (PHYTONADIONE, VITAMIN K1,) 100 mcg Tab Take 100 mcg by mouth. (Patient not taking: Reported on 10/8/2024)      ZENPEP 40,000-126,000- 168,000  unit CpDR SMARTSI Capsule(s) By Mouth (Patient not taking: Reported on 10/8/2024)       No current facility-administered medications on file prior to visit.     Physical Examination  Vitals:    10/08/24 1615   BP: 117/69   Pulse: 71   Temp: 97.9 °F (36.6 °C)     GENERAL:  female in no apparent distress and well developed and well nourished  HEAD:  Normocephalic, without obvious abnormality, atraumatic  EYES: sclera anicteric, conjunctiva normochromic  EARS: normal TM's and external ear canals both ears  NOSE: without erythema or discharge, clear discharge, turbinates normal    OROPHARYNX: moist mucous membranes without erythema, exudates or petechiae  LYMPH NODES: normal, supple, no lymphadenopathy  LUNGS: clear to auscultation, no wheezes, rales or rhonchi, symmetric air entry.  HEART: normal rate, regular rhythm, normal S1, S2, no murmurs, rubs, clicks or gallops.  ABDOMEN: soft, nontender, nondistended, no masses or organomegaly.  MUSCULOSKELETAL: no gross joint deformity or swelling.  NEURO: alert, oriented, normal speech, no focal findings or movement disorder noted.  SKIN: normal coloration and turgor, no rashes, no suspicious skin lesions noted.     Assessment/Plan:   Problem List Items Addressed This Visit          ENT    Allergic rhinitis due to dust mite - Primary    Overview     - 2024: Serum IgE to Zone 6 Aeroallergens positive to cat and dust mites         Current Assessment & Plan     - No acute complaints   - Patient taking Clarinex BID with improvement   - Ordered refills at this time   - Will continue to monitor and reassess          Allergic rhinitis due to animal dander    Overview     - 2024: Serum IgE to Zone 6 Aeroallergens positive to cat and dust mites            Immunology/Multi System    Mast cell activation    Overview     - Normal tryptase level; no D816V mutation           Follow up:  No follow-ups on file.    Greater than 40 minutes spent on the healthcare of this  patient.   Additional timing spent reviewed and interpreting outside medical records.    Marjorie Dick MD   [facility]  Allergy and Immunology

## 2024-10-10 NOTE — ASSESSMENT & PLAN NOTE
- No acute complaints   - Patient taking Clarinex BID with improvement   - Ordered refills at this time   - Will continue to monitor and reassess

## 2025-01-16 ENCOUNTER — HOSPITAL ENCOUNTER (OUTPATIENT)
Dept: RADIOLOGY | Facility: HOSPITAL | Age: 75
Discharge: HOME | End: 2025-01-16
Attending: INTERNAL MEDICINE
Payer: MEDICARE

## 2025-01-16 DIAGNOSIS — K76.0 METABOLIC DYSFUNCTION-ASSOCIATED STEATOTIC LIVER DISEASE (MASLD): ICD-10-CM

## 2025-01-16 PROCEDURE — 76705 ECHO EXAM OF ABDOMEN: CPT | Mod: TC

## 2025-01-16 PROCEDURE — 76705 ECHO EXAM OF ABDOMEN: CPT | Mod: 26,,, | Performed by: STUDENT IN AN ORGANIZED HEALTH CARE EDUCATION/TRAINING PROGRAM

## 2025-02-15 ENCOUNTER — RESULTS FOLLOW-UP (OUTPATIENT)
Dept: HEPATOLOGY | Facility: HOSPITAL | Age: 75
End: 2025-02-15

## 2025-06-03 ENCOUNTER — OFFICE VISIT (OUTPATIENT)
Dept: ALLERGY | Facility: CLINIC | Age: 75
End: 2025-06-03
Payer: MEDICARE

## 2025-06-03 VITALS
BODY MASS INDEX: 25.3 KG/M2 | HEART RATE: 73 BPM | WEIGHT: 161.19 LBS | HEIGHT: 67 IN | DIASTOLIC BLOOD PRESSURE: 80 MMHG | SYSTOLIC BLOOD PRESSURE: 135 MMHG | OXYGEN SATURATION: 96 %

## 2025-06-03 DIAGNOSIS — J30.89 ALLERGIC RHINITIS DUE TO DUST MITE: Primary | ICD-10-CM

## 2025-06-03 DIAGNOSIS — R14.0 ABDOMINAL BLOATING: ICD-10-CM

## 2025-06-03 DIAGNOSIS — J30.81 ALLERGIC RHINITIS DUE TO ANIMAL DANDER: ICD-10-CM

## 2025-06-03 DIAGNOSIS — J45.20 MILD INTERMITTENT ASTHMA WITHOUT COMPLICATION: ICD-10-CM

## 2025-06-03 DIAGNOSIS — D89.40 MAST CELL ACTIVATION: ICD-10-CM

## 2025-06-03 PROCEDURE — 99214 OFFICE O/P EST MOD 30 MIN: CPT | Mod: PBBFAC | Performed by: STUDENT IN AN ORGANIZED HEALTH CARE EDUCATION/TRAINING PROGRAM

## 2025-06-03 PROCEDURE — 99999 PR PBB SHADOW E&M-EST. PATIENT-LVL IV: CPT | Mod: PBBFAC,,, | Performed by: STUDENT IN AN ORGANIZED HEALTH CARE EDUCATION/TRAINING PROGRAM

## 2025-06-03 PROCEDURE — 99214 OFFICE O/P EST MOD 30 MIN: CPT | Mod: S$PBB,,, | Performed by: STUDENT IN AN ORGANIZED HEALTH CARE EDUCATION/TRAINING PROGRAM

## 2025-06-09 ENCOUNTER — HOSPITAL ENCOUNTER (OUTPATIENT)
Dept: RADIOLOGY | Facility: HOSPITAL | Age: 75
Discharge: HOME | End: 2025-06-09
Attending: PHYSICIAN ASSISTANT
Payer: MEDICARE

## 2025-06-09 ENCOUNTER — OFFICE VISIT (OUTPATIENT)
Facility: CLINIC | Age: 75
End: 2025-06-09
Payer: MEDICARE

## 2025-06-09 VITALS — BODY MASS INDEX: 24.64 KG/M2 | WEIGHT: 157 LBS | HEIGHT: 67 IN

## 2025-06-09 DIAGNOSIS — M25.561 RIGHT KNEE PAIN, UNSPECIFIED CHRONICITY: ICD-10-CM
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
## 2025-06-09 DIAGNOSIS — M25.562 LEFT KNEE PAIN, UNSPECIFIED CHRONICITY: ICD-10-CM

## 2025-06-09 DIAGNOSIS — S82.034A CLOSED NONDISPLACED TRANSVERSE FRACTURE OF RIGHT PATELLA, INITIAL ENCOUNTER: ICD-10-CM

## 2025-06-09 DIAGNOSIS — R60.1 GENERALIZED EDEMA: ICD-10-CM

## 2025-06-09 DIAGNOSIS — S80.02XA CONTUSION OF LEFT KNEE, INITIAL ENCOUNTER: ICD-10-CM

## 2025-06-09 DIAGNOSIS — M25.562 LEFT KNEE PAIN, UNSPECIFIED CHRONICITY: Primary | ICD-10-CM

## 2025-06-09 DIAGNOSIS — Z85.840 PERSONAL HISTORY OF MALIGNANT NEOPLASM OF EYE: ICD-10-CM

## 2025-06-09 DIAGNOSIS — S89.91XA RIGHT KNEE INJURY, INITIAL ENCOUNTER: ICD-10-CM

## 2025-06-09 DIAGNOSIS — M79.661 RIGHT CALF PAIN: ICD-10-CM

## 2025-06-09 PROCEDURE — 73560 X-RAY EXAM OF KNEE 1 OR 2: CPT | Mod: TC,PN,LT

## 2025-06-09 PROCEDURE — 73560 X-RAY EXAM OF KNEE 1 OR 2: CPT | Mod: 26,76,LT, | Performed by: RADIOLOGY

## 2025-06-09 PROCEDURE — 73560 X-RAY EXAM OF KNEE 1 OR 2: CPT | Mod: 26,LT,, | Performed by: RADIOLOGY

## 2025-06-09 PROCEDURE — 99204 OFFICE O/P NEW MOD 45 MIN: CPT | Mod: S$PBB,25,, | Performed by: PHYSICIAN ASSISTANT

## 2025-06-09 PROCEDURE — 97760 ORTHOTIC MGMT&TRAING 1ST ENC: CPT | Mod: GP,,, | Performed by: PHYSICIAN ASSISTANT

## 2025-06-09 PROCEDURE — 73564 X-RAY EXAM KNEE 4 OR MORE: CPT | Mod: TC,PN,RT

## 2025-06-09 PROCEDURE — 99999 PR PBB SHADOW E&M-EST. PATIENT-LVL V: CPT | Mod: PBBFAC,,, | Performed by: PHYSICIAN ASSISTANT

## 2025-06-09 RX ORDER — DICLOFENAC SODIUM 10 MG/G
2 GEL TOPICAL 3 TIMES DAILY
Qty: 1 EACH | Refills: 1 | Status: SHIPPED | OUTPATIENT
Start: 2025-06-09

## 2025-06-09 NOTE — PATIENT INSTRUCTIONS
Assessment:  Pat Damon is a 74 y.o. 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    Right knee pain, unspecified chronicity     Left knee pain, unspecified chronicity Yes    Closed nondisplaced transverse fracture of right patella, initial encounter     Generalized edema     Right calf pain     Right knee injury, initial encounter     Contusion of left knee, initial encounter     Personal history of malignant neoplasm of eye     Slipping, tripping and stumbling without falling due to stepping from one level to another, subsequent encounter       Plan:  Data:   I reviewed available old/outside records.   PT/OT:   Start physical therapy at [facility] with Zoran for Patellar fracture   Imagining:   STAT US to r/o blood clot- sent to CVT   Medications:    Voltaren gel    DME and weight bearing status:    TROM brace today set on 0-30 range of motion  Referral:    Dr. Freire follow up for right knee patellar fracture    Education:    Patellar fracture  Non-op patellar fracture management    Return to clinic:    Dr. Levi Freire    Imaging needed at next follow-up: xr right knee       Follow-up: Dr. Freire with x-rays on return. Please reach out to us sooner if there are any problems between now and then.    About Dr. Tani Freire's Research & Publications    Give us Feedback:   Google: Leave Google Review  Healthgrades: Leave Healthgrades Review

## 2025-06-10 NOTE — PROGRESS NOTES
Patient ID: Pat Damon  YOB: 1950  MRN: [id number]    Chief Complaint: Pain and Injury of the Right Knee    Referred By: [facility]    History of Present Illness: Pat Damon is a 74 y.o. 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 She tripped over a suitcase and fell onto her knees during a layover at an airport. Pain is localized to the inferior pole of the patella on the right knee, described as throbbing and the most severe. She also notes some bruising on the left knee, though it is less painful. She has difficulty walking and favors her left knee due to the pain in the right.    Initially, she applied ice to her knee and went to her hotel. The following day, she had a medical appointment and then went to the ER, where XRs were taken. The ER physician reported not seeing anything on the XR but mentioned that a radiologist would review it. She has been using a small knee brace for support and applying ice packs to manage the swelling. Recently, she discovered that the radiologist's report indicated a fracture, which prompted her current visit.    She denies any significant calf pain but mentions feeling a strain and aches due to altered gait. She also reports recent calf tenderness in her right leg.    Her medical history is significant for hypertension, high cholesterol, and a history of SVT for which she had a cardiac ablation a few years ago. She was also recently diagnosed with ocular melanoma in December and received proton therapy for her right eye in [place].    She denies any DVT symptoms, current nursing work, or  activities.    PREVIOUS TREATMENTS:  - Pat has been using a knee brace since the  injury occurred  - Pat has been applying ice to the right knee    IMAGING:  - XR Bilateral Knees 2V: Taken at the ER on 23rd, showing a transverse fracture at the inferior pole of the right patella on the lateral view  - XR Bilateral Knees 2V: Current visit, showing a transverse fracture at the inferior pole of the right patella on the lateral view with some healing noted at the fracture site    MEDICATIONS:  - Naproxen  - Voltaren gel: Applied to knees    SURGICAL HISTORY:  - Cardiac ablation: A couple of years ago, for SVT    FAMILY HISTORY:  - Mother: Cholesterol issues    WORK STATUS:  - Pat is retired  - Previously worked in  and PT sales      ROS:  Musculoskeletal: +joint pain, +joint swelling, +calf pain         Past Medical History:   Past Medical History:   Diagnosis Date    Asthma in remission     Breast cancer 2014    Essential (primary) hypertension     Hypercholesterolemia     Melanoma     Migraines     Pulsatile tinnitus     SVT (supraventricular tachycardia)      Past Surgical History:   Procedure Laterality Date    BREAST LUMPECTOMY      CHOLECYSTECTOMY      EXCISION OF MELANOMA      TONSILLECTOMY      TURBINATE RESECTION       Family History   Problem Relation Name Age of Onset    Hypertension Mother      Heart disease Mother       Social History[1]  Medication List with Changes/Refills   New Medications    DICLOFENAC SODIUM (VOLTAREN) 1 % GEL    Apply 2 g topically 3 (three) times daily.   Current Medications    ASCORBIC ACID, VITAMIN C, (VITAMIN C) 1000 MG TABLET    Take 1,000 mg by mouth.    ASPIRIN (ECOTRIN) 81 MG EC TABLET    Take 1 tablet by mouth every morning.    BUTALBITAL-ACETAMINOPHEN-CAFF -40 MG CAP        DESLORATADINE (CLARINEX) 5 MG TABLET    Take 1 tablet (5 mg total) by mouth 2 (two) times a day.    METOPROLOL TARTRATE (LOPRESSOR) 25 MG TABLET        MULTIVITAMIN WITH FOLIC ACID 400 MCG TAB    Take 1 tablet by mouth once daily.    OMEGA 3-DHA-EPA-FISH OIL  (OMEGA-3) 350 MG-235 MG- 90 MG-597 MG CPDR        PHYTONADIONE, VIT K1, (PHYTONADIONE, VITAMIN K1,) 100 MCG TAB    Take 100 mcg by mouth.    ROSUVASTATIN (CRESTOR) 5 MG TABLET        ZENPEP 40,000-126,000- 168,000 UNIT CPDR         Review of patient's allergies indicates:   Allergen Reactions    Phenytoin sodium extended      Other reaction(s): tachycardia  Other reaction(s): tachycardia     Review of Systems   Constitutional: Negative for chills and fever.   HENT:  Negative for sore throat.    Eyes:  Negative for pain.   Cardiovascular:  Negative for chest pain and leg swelling.   Respiratory:  Negative for cough and shortness of breath.    Skin:  Negative for itching and rash.   Musculoskeletal:  Positive for joint pain, joint swelling and myalgias.   Gastrointestinal:  Negative for abdominal pain, nausea and vomiting.   Genitourinary:  Negative for dysuria.   Neurological:  Negative for dizziness, numbness and paresthesias.       Physical Exam:   Body mass index is 24.59 kg/m².  There were no vitals filed for this visit.   GENERAL: Well appearing, appropriate for stated age, no acute distress.  CARDIOVASCULAR: Pulses regular by peripheral palpation.  PULMONARY: Respirations are even and non-labored.  NEURO: Awake, alert, and oriented x 3.  PSYCH: Mood & affect are appropriate.  HEENT: Head is normocephalic and atraumatic.  General    Nursing note and vitals reviewed.          Right Knee Exam   Right knee exam is normal.    Inspection   Effusion: present    Tenderness   The patient is tender to palpation of the patella and patellar tendon.    Range of Motion   Extension:  0   Flexion:  110     Tests   Ligament Examination   MCL - Valgus: normal (0 to 2mm)  LCL - Varus: normal  Patella   Patellar apprehension: positive  Patellar Grind: positive    Other   Sensation: normal    Left Knee Exam   Left knee exam is normal.    Inspection   Bruising: present (anterior knee)    Tenderness   The patient tender to palpation  of the patella.    Range of Motion   Extension:  0   Flexion:  130     Tests   Stability   MCL - Valgus: normal (0 to 2mm)  LCL - Varus: normal (0 to 2mm)  Patella   Patellar Grind: positive    Other   Sensation: normal    Muscle Strength   Right Lower Extremity   Hip Abduction: 5/5   Quadriceps:  4/5   Hamstrin/5   Left Lower Extremity   Hip Abduction: 5/5   Quadriceps:  5/5 and 4/5   Hamstrin/5     Vascular Exam     Right Pulses  Dorsalis Pedis:      2+  Posterior Tibial:      2+        Left Pulses  Dorsalis Pedis:      2+  Posterior Tibial:      2+          Physical Exam    Musculoskeletal: Swelling in right knee. Limited range of motion in right knee: 100 degrees. Pain at inferior pole of the patella. Bruising on left knee. Great range of motion in left knee: 135 degrees.         Imaging:    X-Ray Knee 1 or 2 View Left  Narrative: EXAMINATION:  XR KNEE 1 OR 2 VIEW LEFT    CLINICAL HISTORY:  Pain in left knee    TECHNIQUE:  One or two views of the left knee were performed.    COMPARISON:  None    FINDINGS:  Lateral view of the left knee demonstrates minimal osteophyte formation at the superior pole of the patella.  No joint effusion suggested.  No obvious fractures suggested on the single view exam..  Impression: As above    Electronically signed by: Mulugeta Moncada DO  Date:    2025  Time:    13:10  X-ray Knee Ortho Right with Flexion (XPD)  Narrative: EXAMINATION:  XR KNEE ORTHO RIGHT WITH FLEXION (XPD)    CLINICAL HISTORY:  Pain in right knee    TECHNIQUE:  AP standing views of both knees, AP flexion views of both knees, lateral view of the right knee and Merchant views of both knees    COMPARISON:  None    FINDINGS:  The joint spaces of all 3 compartments of the right knee are well maintained.  There is an acute transversely oriented fracture through the inferior pole of the right patella without significant distraction seen at the main fracture site.  No significant joint effusion  identified.  Impression: 1.  As above    Electronically signed by: Mulugeta Moncada DO  Date:    06/09/2025  Time:    12:02        Relevant imaging results reviewed and interpreted by me, discussed with the patient and / or family today.     Other Tests:   Under my direction and supervision, 10 minutes were spent sizing, fitting, and educating regarding durable medical equipment by an assistant today.  CPT 90320.     Patient Instructions   Assessment:  Pat Damon is a 74 y.o. 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    Right knee pain, unspecified chronicity     Left knee pain, unspecified chronicity Yes    Closed nondisplaced transverse fracture of right patella, initial encounter     Generalized edema     Right calf pain     Right knee injury, initial encounter     Contusion of left knee, initial encounter     Personal history of malignant neoplasm of eye     Slipping, tripping and stumbling without falling due to stepping from one level to another, subsequent encounter       Plan:  Data:   I reviewed available old/outside records.   PT/OT:   Start physical therapy at [facility] with Zoran for Patellar fracture   Imagining:   STAT US to r/o blood clot- sent to CVT   Medications:    Voltaren gel    DME and weight bearing status:    TROM brace today set on 0-30 range of motion  Referral:    Dr. Freire follow up for right knee patellar fracture    Education:    Patellar fracture  Non-op patellar fracture management    Return to clinic:    Dr. Levi Freire    Imaging needed at next follow-up: xr right knee       Follow-up: Dr. Freire with x-rays on return. Please reach out to us sooner if there are any problems between now and then.    About Dr. Tani Freire's Research & Publications    Give us Feedback:    Google: Leave Google Review  Healthgrades: Leave Healthgrades Review       Provider Note/Medical Decision Making:   Discussed patella fractures and healing of fractures in detail with the patient.  We printed out a non op patella fracture protocol which will start protocol.  Patient will follow up with Dr. Levi Freire.      I discussed worrisome and red flag signs and symptoms with the patient. The patient expressed understanding and agreed to alert me immediately or to go to the emergency room if they experience any of these.   Treatment plan was developed with input from the patient/family, and they expressed understanding and agreement with the plan. All questions were answered today.      Gema Giang PA-C  Sports Medicine Physician Assistant     Disclaimer: This note was prepared using a voice recognition system and is likely to have sound alike errors within the text.     This note was generated with the assistance of ambient listening technology. Verbal consent was obtained by the patient and accompanying visitor(s) for the recording of patient appointment to facilitate this note. I attest to having reviewed and edited the generated note for accuracy, though some syntax or spelling errors may persist. Please contact the author of this note for any clarification.             [1]   Social History  Socioeconomic History    Marital status: Single   Tobacco Use    Smoking status: Never    Smokeless tobacco: Never   Substance and Sexual Activity    Alcohol use: Not Currently     Alcohol/week: 1.0 standard drink of alcohol     Types: 1 Glasses of wine per week    Drug use: No    Sexual activity: Not Currently

## 2025-06-11 ENCOUNTER — TELEPHONE (OUTPATIENT)
Dept: SPORTS MEDICINE | Facility: CLINIC | Age: 75
End: 2025-06-11
Payer: MEDICARE

## 2025-06-11 NOTE — TELEPHONE ENCOUNTER
Left voicemail about order for CVT ultrasound. Let the patient know the order was faxed to the new number provided.

## 2025-06-12 ENCOUNTER — CLINICAL SUPPORT (OUTPATIENT)
Dept: REHABILITATION | Facility: HOSPITAL | Age: 75
End: 2025-06-12
Payer: MEDICARE

## 2025-06-12 DIAGNOSIS — M25.561 ACUTE PAIN OF RIGHT KNEE: Primary | ICD-10-CM

## 2025-06-12 DIAGNOSIS — S82.034A CLOSED NONDISPLACED TRANSVERSE FRACTURE OF RIGHT PATELLA, INITIAL ENCOUNTER: ICD-10-CM

## 2025-06-12 PROCEDURE — 97161 PT EVAL LOW COMPLEX 20 MIN: CPT | Mod: PN

## 2025-06-12 PROCEDURE — 97112 NEUROMUSCULAR REEDUCATION: CPT | Mod: PN

## 2025-06-12 NOTE — PROGRESS NOTES
Outpatient Rehab    Physical Therapy Evaluation    Date: 6/12/2025     Clinic Number: [id number]    Patient Name: Pat Damon  MRN: [id number]  YOB: 1950  Today's Date: 6/18/2025    Therapy Diagnosis:   Encounter Diagnoses   Name Primary?    Closed nondisplaced transverse fracture of right patella, initial encounter     Acute pain of right knee Yes       Physician: Gema León, *    Physician Orders: Eval and Treat  Medical Diagnosis: Closed nondisplaced transverse fracture of right patella, initial encounter    Visit # / Visits Authorized:  1 / 1  Date of Evaluation:  6/12/2025   Insurance Authorization Period: 6/9/2025 to 6/9/2026  Plan of Care Certification:  6/12/2025 to 8/8/2025      Time In:  1530  Time Out:  1635  Total Billable Time: 45 min      SUBJECTIVE     Date of onset: 5/22/2025    History of current condition - Pat reports about three weeks ago she was pulling a suitcase when it got hung up and caused her to trip and fall on her hands and knees. She states she was initially able to walk on her knee for a bit but it started getting painful and she had to use a wheelchair for mobility. She went to get another opinion when she got home and was found to have a fracture on her patella.     Falls: 1    Imaging: [x] Xray [] MRI [] CT: Performed on: 6/9/2025 FINDINGS:  The joint spaces of all 3 compartments of the right knee are well maintained.  There is an acute transversely oriented fracture through the inferior pole of the right patella without significant distraction seen at the main fracture site.  No significant joint effusion identified.    Pain:  Current 0/10, worst 5/10, best 0/10   Location: [x] Right   [] Left:  Knee  Description: aching   Aggravating Factors: walking a lot  Easing Factors: activity avoidance, rest    Prior Therapy:   [x] N/A    [] Yes:   Social History: Pt lives alone  Occupation: Pt is a retired nurse.  Prior Level of Function: Independent and pain free with all  ADL, IADL, community mobility and functional activities.   Current Level of Function: Independent with all ADL, IADL, community mobility and functional activities with reports of increased pain and need for increased time and frequent breaks.      Dominant Extremity:    [x] Right    [] Left    Pts goals: Pt reported goals are to decrease overall pain levels in order to return to prior functional level.     Medical History:   Past Medical History:   Diagnosis Date    Asthma in remission     Breast cancer 2014    Essential (primary) hypertension     Hypercholesterolemia     Melanoma     Migraines     Pulsatile tinnitus     SVT (supraventricular tachycardia)        Surgical History:   Pat Damon  has a past surgical history that includes Cholecystectomy; Breast lumpectomy; Tonsillectomy; Excision of melanoma; and Turbinate resection.    Medications:   Pat Melchor has a current medication list which includes the following prescription(s): ascorbic acid (vitamin c), aspirin, butalbital-acetaminophen-caff, desloratadine, diclofenac sodium, metoprolol tartrate, multivitamin with folic acid, omega-3, phytonadione (vitamin k1), rosuvastatin, and zenpep.    Allergies:   Review of patient's allergies indicates:   Allergen Reactions    Phenytoin sodium extended      Other reaction(s): tachycardia  Other reaction(s): tachycardia        Objective      OBSERVATION: TROM brace 0-30deg     SENSATION:  [x] Intact to Light Touch     [] Impaired:    PALPATION: tenderness to palpation to Inferior patella, patellar tendon    JOINT MOBILITY: normal joint mobility      Knee AROM/PROM Right Left Goals   Knee Flexion (135º) 30 135 135   Knee Extension (0º) 0 0 0      Lower Extremity  Strength RIGHT   LEFT   Hip Flexion  NT 5/5   Hip abduction  NT 5/5   Hip extension NT 5/5   Knee Flexion NT 5/5   Knee Extension NT 5/5         Function:     CMS Impairment/Limitation/Restriction for FOTO Knee Survey    Therapist reviewed FOTO scores  for Pat on 6/12/2025.   FOTO documents entered into [employer] - see Media section.    Functional Score: 48% (Predicted 64%)         Treatment     CPT Intervention  R Knee Duration / Intensity  6/18   MT Manual  -   NMR Quad Set 20x5s   NMR SLR 3x10   NMR SL Hip Abd 3x10   NMR Prone Hip Ext 3x10           PLAN         CPT Codes available for Billing:   (-) minutes of Manual therapy (MT) to improve pain and ROM.  (-) minutes of Therapeutic Exercise (TE) to develop strength, endurance, range of motion, and flexibility.  (20) minutes of Neuromuscular Re-Education (NMR)  to improve: Balance, Coordination, Kinesthetic, Sense, Proprioception, and Posture.  (-) minutes of Therapeutic Activities (TA) to improve functional performance.  (-) Unattended Electrical Stimulation (ES) for muscle performance or pain modulation.  (-) Vasopneumatic Device Therapy () for management of swelling/edema. (40594)  (-) BFR: Blood flow restriction applied during exercise  NP or (-): Not Performed    Total Treatment time (time-based codes) separate from Evaluation: (20) minutes     Patient Education and Home Exercises     Education provided:  PURPOSE: Patient educated on the impairments noted above and the effects of physical therapy intervention to improve overall condition and QOL.   EXERCISE: Patient was educated on all the above exercise prior/during/after for proper posture, positioning, and execution for safe performance with home exercise program.   STRENGTH: Patient educated on the importance of improved core and extremity strength in order to improve alignment of the spine and extremities with static positions and dynamic movement.   GAIT & BALANCE: Patient educated on the importance of strong core and lower extremity musculature in order to improve both static and dynamic balance, improve gait mechanics, reduce fall risk and improve household and community mobility.   BRACE: patient educated on proper fit, positioning, and technique  for donning and doffing brace in order to maintain optimal alignment of the extremity and promote healing     Written Home Exercises Provided: yes.  Exercises were reviewed and Pat was able to demonstrate them prior to the end of the session.  Pat demonstrated good  understanding of the education provided. See EMR under Patient Instructions for exercises provided during therapy sessions.    ASSESSMENT     Pat Melchor is a 74 y.o. female referred to outpatient Physical Therapy with a medical diagnosis of Closed nondisplaced transverse fracture of right patella, initial encounter. Pt presents with impairments in the following categories: IMPAIRMENTS: ROM, strength, joint mobility, gait mechanics, and functional movement patterns.    Pt prognosis is Excellent  Pt will benefit from skilled outpatient Physical Therapy to address the deficits stated above and in the chart below, provide pt/family education, and to maximize pt's level of independence.     Plan of care discussed with patient: Yes  Pt's spiritual, cultural and educational needs considered and patient is agreeable to the plan of care and goals as stated below:     Anticipated Barriers for therapy: none    Medical Necessity is demonstrated by the following  History  Co-morbidities and personal factors that may impact the plan of care [x] LOW: no personal factors / co-morbidities  [] MODERATE: 1-2 personal factors / co-morbidities  [] HIGH: 3+ personal factors / co-morbidities    Moderate / High Support Documentation:   Past Medical History:   Diagnosis Date    Asthma in remission     Breast cancer 2014    Essential (primary) hypertension     Hypercholesterolemia     Melanoma     Migraines     Pulsatile tinnitus     SVT (supraventricular tachycardia)         Examination  Body Structures and Functions, activity limitations and participation restrictions that may impact the plan of care [x] LOW: addressing 1-2 elements  [] MODERATE: 3+ elements  [] HIGH: 4+  "elements (please support below)    Moderate / High Support Documentation: See above in "Current Level of Function"      Clinical Presentation [x] LOW: stable  [] MODERATE: Evolving  [] HIGH: Unstable     Decision Making/ Complexity Score: low       Plan of care Certification: 6/12/2025 to 8/8/2025.    Outpatient Physical Therapy 2 times weekly for 8 weeks to include any combination of the following interventions: virtual visits, dry needling, modalities, electrical stimulation (IFC, Pre-Mod, Attended with Functional Dry Needling), Electrical Stimulation  , Manual Therapy, Moist Heat/ Ice, Neuromuscular Re-ed, Patient Education, Self Care, Therapeutic Exercise, and Therapeutic Activites     GOALS:     Short Term Goals:  4 weeks Status  Date Met   PAIN: Pt will report worst pain of 3/10 in order to progress toward max functional ability and improve quality of life. [x] Progressing  [] Met  [] Not Met    FUNCTION: Patient will demonstrate improved function as indicated by a functional score of greater than or equal to 50% predicted on FOTO. [x] Progressing  [] Met  [] Not Met    HEP: Patient will demonstrate independence with HEP in order to progress toward functional independence. [x] Progressing  [] Met  [] Not Met      Long Term Goals:  8 weeks Status Date Met   PAIN: Pt will report worst pain of 1/10 in order to progress toward max functional ability and improve quality of life [x] Progressing  [] Met  [] Not Met    FUNCTION: Patient will demonstrate improved function as indicated by a functional score of greater than or equal to predicted score on FOTO. [x] Progressing  [] Met  [] Not Met    MOBILITY: Patient will improve AROM to stated goals, listed in objective measures above, in order to return to maximal functional potential and improve quality of life.  [x] Progressing  [] Met  [] Not Met    STRENGTH: Patient will improve strength to stated goals, listed in objective measures above, in order to improve " functional independence and quality of life.  [x] Progressing  [] Met  [] Not Met    GAIT: Patient will demonstrate normalized gait mechanics with minimal compensation in order to return to PLOF. [x] Progressing  [] Met  [] Not Met    Patient will return to normal ADL's, IADL's, community involvement, recreational activities, and work-related activities with less than or equal to 1/10 pain and maximal function.  [x] Progressing  [] Met  [] Not Met            Zoran Bradford, PT, DPT

## 2025-06-18 ENCOUNTER — CLINICAL SUPPORT (OUTPATIENT)
Dept: REHABILITATION | Facility: HOSPITAL | Age: 75
End: 2025-06-18
Payer: MEDICARE

## 2025-06-18 DIAGNOSIS — M25.561 ACUTE PAIN OF RIGHT KNEE: Primary | ICD-10-CM

## 2025-06-18 PROCEDURE — 97110 THERAPEUTIC EXERCISES: CPT | Mod: PN

## 2025-06-18 PROCEDURE — 97112 NEUROMUSCULAR REEDUCATION: CPT | Mod: PN

## 2025-06-19 ENCOUNTER — OFFICE VISIT (OUTPATIENT)
Dept: SPORTS MEDICINE | Facility: CLINIC | Age: 75
End: 2025-06-19
Payer: MEDICARE

## 2025-06-19 ENCOUNTER — HOSPITAL ENCOUNTER (OUTPATIENT)
Dept: RADIOLOGY | Facility: HOSPITAL | Age: 75
Discharge: HOME | End: 2025-06-19
Attending: ORTHOPAEDIC SURGERY
Payer: MEDICARE

## 2025-06-19 VITALS — BODY MASS INDEX: 24.63 KG/M2 | HEIGHT: 67 IN | WEIGHT: 156.94 LBS

## 2025-06-19 DIAGNOSIS — M25.561 ACUTE PAIN OF RIGHT KNEE: Primary | ICD-10-CM

## 2025-06-19 DIAGNOSIS — M25.561 ACUTE PAIN OF RIGHT KNEE: ICD-10-CM

## 2025-06-19 DIAGNOSIS — S82.091A OTHER CLOSED FRACTURE OF RIGHT PATELLA, INITIAL ENCOUNTER: ICD-10-CM

## 2025-06-19 PROCEDURE — 73562 X-RAY EXAM OF KNEE 3: CPT | Mod: 26,LT,, | Performed by: RADIOLOGY

## 2025-06-19 PROCEDURE — 99213 OFFICE O/P EST LOW 20 MIN: CPT | Mod: PBBFAC,25,PN | Performed by: ORTHOPAEDIC SURGERY

## 2025-06-19 PROCEDURE — 99214 OFFICE O/P EST MOD 30 MIN: CPT | Mod: S$PBB,,, | Performed by: ORTHOPAEDIC SURGERY

## 2025-06-19 PROCEDURE — 73562 X-RAY EXAM OF KNEE 3: CPT | Mod: TC,PN,LT

## 2025-06-19 PROCEDURE — 99999 PR PBB SHADOW E&M-EST. PATIENT-LVL III: CPT | Mod: PBBFAC,,, | Performed by: ORTHOPAEDIC SURGERY

## 2025-06-19 PROCEDURE — 73564 X-RAY EXAM KNEE 4 OR MORE: CPT | Mod: 26,RT,, | Performed by: RADIOLOGY

## 2025-06-20 NOTE — PROGRESS NOTES
"      Patient ID: Pat Damon  YOB: 1950  MRN: [id number]    Chief Complaint: Pain and Injury of the Right Knee    Referred By: [facility]    History of Present Illness: Pat Damon is an established patient 74 y.o. 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 Right knee pain began immediately after the fall. She initially walked on the injured knee, not realizing the extent of the injury. She reports difficulty with bracing.    The day after the injury, she visited an ER where XRs were taken. She was told there were no breaks, but upon later reviewing the ER report, she discovered it mentioned a "transverse fracture of the patella." She continued to walk on the injured knee for about 2.5 weeks, keeping her leg straight.    For pain management, she has used ice, elevation, cream, and Advil. She has started PT at [facility] with Zoran following a non-operative protocol. She is currently wearing a T-room knee brace.    She denies any medical diagnoses. Ice: Applied to the right knee, minimal benefit  Cream: Applied to the right knee, minimal benefit  Elevation: Used for the right knee, minimal benefit  PT: Started at [facility] with Zoran, following a non-operative protocol    MEDICATIONS:  - Advil (ibuprofen): For knee pain      ROS:  Musculoskeletal: +joint pain, +limb pain, +difficulty walking  Hematologic/Lymphatic: +easy bruising       Recall from previous " HPI with Gema León PA-C on 6/9/25: HPI: Pat, a retired nurse, presents for evaluation of a knee injury that occurred on 5/22/25. She tripped over a suitcase and fell onto her knees during a layover at an airport. Pain is localized to the inferior pole of the patella on the right knee, described as throbbing and the most severe. She also notes some bruising on the left knee, though it is less painful. She has difficulty walking and favors her left knee due to the pain in the right. Initially, she applied ice to her knee and went to her hotel. The following day, she had a medical appointment and then went to the ER, where XRs were taken. The ER physician reported not seeing anything on the XR but mentioned that a radiologist would review it. She has been using a small knee brace for support and applying ice packs to manage the swelling. Recently, she discovered that the radiologist's report indicated a fracture, which prompted her current visit. She denies any significant calf pain but mentions feeling a strain and aches due to altered gait. She also reports recent calf tenderness in her right leg. Her medical history is significant for hypertension, high cholesterol, and a history of SVT for which she had a cardiac ablation a few years ago. She was also recently diagnosed with ocular melanoma in December and received proton therapy for her right eye in [place]. She denies any DVT symptoms, current nursing work, or  activities.    Past Medical History:   Past Medical History:   Diagnosis Date    Asthma in remission     Breast cancer 2014    Essential (primary) hypertension     Hypercholesterolemia     Melanoma     Migraines     Pulsatile tinnitus     SVT (supraventricular tachycardia)      Past Surgical History:   Procedure Laterality Date    BREAST LUMPECTOMY      CHOLECYSTECTOMY      EXCISION OF MELANOMA      TONSILLECTOMY      TURBINATE RESECTION       Family History   Problem Relation  Name Age of Onset    Hypertension Mother      Heart disease Mother       Social History[1]  Medication List with Changes/Refills   Current Medications    ASCORBIC ACID, VITAMIN C, (VITAMIN C) 1000 MG TABLET    Take 1,000 mg by mouth.    ASPIRIN (ECOTRIN) 81 MG EC TABLET    Take 1 tablet by mouth every morning.    BUTALBITAL-ACETAMINOPHEN-CAFF -40 MG CAP        DESLORATADINE (CLARINEX) 5 MG TABLET    Take 1 tablet (5 mg total) by mouth 2 (two) times a day.    DICLOFENAC SODIUM (VOLTAREN) 1 % GEL    Apply 2 g topically 3 (three) times daily.    METOPROLOL TARTRATE (LOPRESSOR) 25 MG TABLET        MULTIVITAMIN WITH FOLIC ACID 400 MCG TAB    Take 1 tablet by mouth once daily.    OMEGA 3-DHA-EPA-FISH OIL (OMEGA-3) 350 MG-235 MG- 90 MG-597 MG CPDR        PHYTONADIONE, VIT K1, (PHYTONADIONE, VITAMIN K1,) 100 MCG TAB    Take 100 mcg by mouth.    ROSUVASTATIN (CRESTOR) 5 MG TABLET        ZENPEP 40,000-126,000- 168,000 UNIT CPDR         Review of patient's allergies indicates:   Allergen Reactions    Phenytoin sodium extended      Other reaction(s): tachycardia  Other reaction(s): tachycardia     ROS    Physical Exam:   Body mass index is 24.58 kg/m².  There were no vitals filed for this visit.   GENERAL: Well appearing, appropriate for stated age, no acute distress.  CARDIOVASCULAR: Pulses regular by peripheral palpation.  PULMONARY: Respirations are even and non-labored.  NEURO: Awake, alert, and oriented x 3.  PSYCH: Mood & affect are appropriate.  HEENT: Head is normocephalic and atraumatic.            Right Knee Exam     Tenderness   The patient is tender to palpation of the patella.    Range of Motion   Extension:  0   Flexion:  90     Tests   Ligament Examination   Lachman: normal (-1 to 2mm)   MCL - Valgus: normal (0 to 2mm)  LCL - Varus: normal    Other   Sensation: normal    Comments:  No extension lag  Intact EHL, FHL, gastrocsoleus, and tibialis anterior. Sensation intact to light touch in superficial  peroneal, deep peroneal, tibial, sural, and saphenous nerve distributions. Foot warm and well perfused with capillary refill of less than 2 seconds and palpable pedal pulses.      Muscle Strength   Right Lower Extremity   Hip Abduction: 5/5   Quadriceps:  5/5   Hamstrin/5     Vascular Exam     Right Pulses  Dorsalis Pedis:      2+  Posterior Tibial:      2+        Physical Exam    IMAGING:  - XR Right Knee: 2025, revealed a transverse fracture of the patella           Imaging:    X-ray Knee Ortho Right with Flexion (XPD)  Narrative: EXAM:  XR KNEE ORTHO RIGHT WITH FLEXION (XPD)    CLINICAL HISTORY: Right knee pain    COMPARISON: Knee radiograph 2025    TECHNIQUE: Standing and standing PA flexion views of both knees.  Lateral view of the right knee.    FINDINGS:  Slight interval healing of a not significantly displaced inferior pole patellar fracture.  Joint alignment is anatomic.  Mild medial compartment joint space loss.  No significant suprapatellar joint effusion.  Similar thickening of the patellar tendon.  Impression:  As above.    Finalized on: 2025 6:25 PM By:  Jose Armendariz MD  [facility]# [id number]      2025 18:27:[id number]     [facility]      Relevant imaging results reviewed and interpreted by me, discussed with the patient and / or family today.     Other Tests:     Assessment & Plan    PLAN SUMMARY:   XR Right Knee ordered for 4 weeks from now   Unlock knee brace and allow bending when not walking   Keep knee brace locked straight when walking   Follow-up in 4 weeks for repeat XRs and further evaluation    DISPLACED TRANSVERSE FRACTURE OF RIGHT PATELLA:   Keep knee brace locked straight when walking.   Unlock brace and allow bending when not walking.   Minimize stress on the knee.   Ordered XR Right Knee in 4 weeks to assess healing progress.    FOLLOW-UP:   Follow up in 4 weeks for repeat XRs and further evaluation.         Patient Instructions   Assessment:  Pat Damon is a   74 y.o. 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    Acute pain of right knee Yes    Other closed fracture of right patella, initial encounter         Plan:  Continue PT with Zoran at [facility] following non-op patella fracture protocol  Continue TROM locked in extension with ambulation, WBAT with brace locked    Follow-up: 4 weeks with repeat xrays( AP/Lateral). Please reach out to us sooner if there are any problems between now and then.    About Dr. Tani Freire's Research & Publications    Give us Feedback:   Google: Leave Google Review  Healthgrades: Leave Healthgrades Review       Provider Note/Medical Decision Making:     I discussed worrisome and red flag signs and symptoms with the patient. The patient expressed understanding and agreed to alert me immediately or to go to the emergency room if they experience any of these. Treatment plan was developed with input from the patient/family, and they expressed understanding and agreement with the plan. All questions were answered today.          Levi Freire MD  Board Certified in Orthopaedic Surgery & Sports Medicine   Regional Section Head of Orthopedic Surgery & Sports Medicine  [facility]      Disclaimer: This note was prepared using a voice recognition system and is likely to have sound alike errors within the text.     This note was generated with the assistance of ambient listening technology. Verbal consent was obtained by the patient and accompanying visitor(s) for the recording of patient appointment to facilitate this note. I attest to having reviewed and edited the generated note for accuracy, though some syntax or spelling errors may persist. Please contact the author of this note for any clarification.    I, Jacklyn Lyle, acted as a  Levi Adams MD for the duration of this office visit.         [1]   Social History  Socioeconomic History    Marital status: Single   Tobacco Use    Smoking status: Never    Smokeless tobacco: Never   Substance and Sexual Activity    Alcohol use: Not Currently     Alcohol/week: 1.0 standard drink of alcohol     Types: 1 Glasses of wine per week    Drug use: No    Sexual activity: Not Currently

## 2025-06-20 NOTE — PATIENT INSTRUCTIONS
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    Acute pain of right knee Yes    Other closed fracture of right patella, initial encounter         Plan:  Continue PT with Zoran at [facility] following non-op patella fracture protocol  Continue TROM locked in extension with ambulation, WBAT with brace locked    Follow-up: 4 weeks with repeat xrays( AP/Lateral). Please reach out to us sooner if there are any problems between now and then.    About Dr. Tani Freire's Research & Publications    Give us Feedback:   Google: Leave Google Review  Healthgrades: Leave Healthgrades Review

## 2025-06-23 ENCOUNTER — CLINICAL SUPPORT (OUTPATIENT)
Dept: REHABILITATION | Facility: HOSPITAL | Age: 75
End: 2025-06-23
Payer: MEDICARE

## 2025-06-23 DIAGNOSIS — M25.561 ACUTE PAIN OF RIGHT KNEE: Primary | ICD-10-CM

## 2025-06-23 PROCEDURE — 97110 THERAPEUTIC EXERCISES: CPT | Mod: PN

## 2025-06-23 PROCEDURE — 97112 NEUROMUSCULAR REEDUCATION: CPT | Mod: PN

## 2025-06-25 ENCOUNTER — CLINICAL SUPPORT (OUTPATIENT)
Dept: REHABILITATION | Facility: HOSPITAL | Age: 75
End: 2025-06-25
Payer: MEDICARE

## 2025-06-25 DIAGNOSIS — M25.561 ACUTE PAIN OF RIGHT KNEE: Primary | ICD-10-CM

## 2025-06-25 PROCEDURE — 97112 NEUROMUSCULAR REEDUCATION: CPT | Mod: PN

## 2025-06-25 PROCEDURE — 97110 THERAPEUTIC EXERCISES: CPT | Mod: PN

## 2025-06-26 NOTE — PROGRESS NOTES
Outpatient Rehab    Physical Therapy Visit    Patient Name: Pat Damon  MRN: [id number]  YOB: 1950  Encounter Date: 6/23/2025    Therapy Diagnosis:   Encounter Diagnosis   Name Primary?    Acute pain of right knee Yes     Physician: Gema León, *    Physician Orders: Eval and Treat  Medical Diagnosis: Closed nondisplaced transverse fracture of right patella, initial encounter  Surgical Diagnosis: Not applicable for this Episode   Surgical Date: Not applicable for this Episode  Days Since Last Surgery: Not applicable for this Episode    Visit # / Visits Authorized:  3 / 8  Insurance Authorization Period: 6/13/2025 to 12/31/2025  Date of Evaluation: 6/12/2025  Plan of Care Certification: 6/12/2025 to 8/8/2025      PT/PTA:     Number of PTA visits since last PT visit:   Time In:   1600  Time Out:  1700  Total Time (in minutes):     Total Billable Time (in minutes):  40 minutes    FOTO:  Intake Score:  %  Survey Score 2:  %  Survey Score 3:  %    Precautions:       Subjective    Patient states no new complaints.      Pain 0/10    Objective    Objective Measures updated at progress report unless specified.         Treatment:     CPT Intervention  R Knee Duration / Intensity  6/23   MT Manual  -   NMR Quad Set 20x5s   NMR SLR 3x10   NMR SL Hip Abd 3x10   NMR Prone Hip Ext 3x10   NMR HS Isometrics 20x5s   TE HS Str 3x30s   TE Gastroc Str 3x30s   TE 4 way ankle 3x10 RTB   PLAN             CPT Codes available for Billing:   (0) minutes of Manual therapy (MT) to improve pain and ROM.  (15) minutes of Therapeutic Exercise (TE) to develop strength, endurance, range of motion, and flexibility.  (25) minutes of Neuromuscular Re-Education (NMR)  to improve: Balance, Coordination, Kinesthetic, Sense, Proprioception, and Posture.  (0) minutes of Therapeutic Activities (TA) to improve functional performance.  (-) Unattended Electrical Stimulation (ES) for muscle performance or pain modulation.  (-)  Vasopneumatic Device Therapy () for management of swelling/edema. (28770)  (-) BFR: Blood flow restriction applied during exercise  NP or (-): Not Performed    Time Entry(in minutes):       Assessment & Plan   Assessment:  Patient tolerated session well. Progressed ROM to 45 degrees per protocol without pain.        The patient will continue to benefit from skilled outpatient physical therapy in order to address the deficits listed in the problem list on the initial evaluation, provide patient and family education, and maximize the patients level of independence in the home and community environments.     The patient's spiritual, cultural, and educational needs were considered, and the patient is agreeable to the plan of care and goals.         Plan:  Continue to progress as tolerated under current POC.    Goals:      Short Term Goals:  4 weeks Status  Date Met   PAIN: Pt will report worst pain of 3/10 in order to progress toward max functional ability and improve quality of life. [x] Progressing  [] Met  [] Not Met     FUNCTION: Patient will demonstrate improved function as indicated by a functional score of greater than or equal to 50% predicted on FOTO. [x] Progressing  [] Met  [] Not Met     HEP: Patient will demonstrate independence with HEP in order to progress toward functional independence. [x] Progressing  [] Met  [] Not Met        Long Term Goals:  8 weeks Status Date Met   PAIN: Pt will report worst pain of 1/10 in order to progress toward max functional ability and improve quality of life [x] Progressing  [] Met  [] Not Met     FUNCTION: Patient will demonstrate improved function as indicated by a functional score of greater than or equal to predicted score on FOTO. [x] Progressing  [] Met  [] Not Met     MOBILITY: Patient will improve AROM to stated goals, listed in objective measures above, in order to return to maximal functional potential and improve quality of life.  [x] Progressing  [] Met  []  Not Met     STRENGTH: Patient will improve strength to stated goals, listed in objective measures above, in order to improve functional independence and quality of life.  [x] Progressing  [] Met  [] Not Met     GAIT: Patient will demonstrate normalized gait mechanics with minimal compensation in order to return to PLOF. [x] Progressing  [] Met  [] Not Met     Patient will return to normal ADL's, IADL's, community involvement, recreational activities, and work-related activities with less than or equal to 1/10 pain and maximal function.  [x] Progressing  [] Met  [] Not Met          Zoran Bradford, PT

## 2025-06-26 NOTE — PROGRESS NOTES
Outpatient Rehab    Physical Therapy Visit    Patient Name: Pat Damon  MRN: [id number]  YOB: 1950  Encounter Date: 6/18/2025    Therapy Diagnosis:   Encounter Diagnosis   Name Primary?    Acute pain of right knee Yes     Physician: Gema León, *    Physician Orders: Eval and Treat  Medical Diagnosis: Closed nondisplaced transverse fracture of right patella, initial encounter  Surgical Diagnosis: Not applicable for this Episode   Surgical Date: Not applicable for this Episode  Days Since Last Surgery: Not applicable for this Episode    Visit # / Visits Authorized:  3 / 8  Insurance Authorization Period: 6/13/2025 to 12/31/2025  Date of Evaluation: 6/12/2025  Plan of Care Certification: 6/12/2025 to 8/8/2025      PT/PTA:     Number of PTA visits since last PT visit:   Time In:  1630  Time Out:  1530  Total Time (in minutes):     Total Billable Time (in minutes):  40 minutes    FOTO:  Intake Score:  %  Survey Score 2:  %  Survey Score 3:  %    Precautions:       Subjective    Patient states she is doing ok. Walking with the brace is awkward.      Pain 0/10    Objective    Objective Measures updated at progress report unless specified.          Treatment:     CPT Intervention  R Knee Duration / Intensity  6/18   MT Manual  -   NMR Quad Set 20x5s   NMR SLR 3x10   NMR SL Hip Abd 3x10   NMR Prone Hip Ext 3x10   NMR HS Isometrics 20x5s   TE HS Str 3x30s   TE Gastroc Str 3x30s   TE 4 way ankle 3x10 RTB   PLAN             CPT Codes available for Billing:   (0) minutes of Manual therapy (MT) to improve pain and ROM.  (15) minutes of Therapeutic Exercise (TE) to develop strength, endurance, range of motion, and flexibility.  (25) minutes of Neuromuscular Re-Education (NMR)  to improve: Balance, Coordination, Kinesthetic, Sense, Proprioception, and Posture.  (0) minutes of Therapeutic Activities (TA) to improve functional performance.  (-) Unattended Electrical Stimulation (ES) for muscle performance or  pain modulation.  (-) Vasopneumatic Device Therapy () for management of swelling/edema. (78287)  (-) BFR: Blood flow restriction applied during exercise  NP or (-): Not Performed    Time Entry(in minutes):       Assessment & Plan   Assessment:  Patient tolerated session well. Required VC and TC for appropriate technique and to prevent substitutions.       The patient will continue to benefit from skilled outpatient physical therapy in order to address the deficits listed in the problem list on the initial evaluation, provide patient and family education, and maximize the patients level of independence in the home and community environments.     The patient's spiritual, cultural, and educational needs were considered, and the patient is agreeable to the plan of care and goals.           Plan:  Continue to progress as tolerated under current POC.      Goals:     Short Term Goals:  4 weeks Status  Date Met   PAIN: Pt will report worst pain of 3/10 in order to progress toward max functional ability and improve quality of life. [x] Progressing  [] Met  [] Not Met     FUNCTION: Patient will demonstrate improved function as indicated by a functional score of greater than or equal to 50% predicted on FOTO. [x] Progressing  [] Met  [] Not Met     HEP: Patient will demonstrate independence with HEP in order to progress toward functional independence. [x] Progressing  [] Met  [] Not Met        Long Term Goals:  8 weeks Status Date Met   PAIN: Pt will report worst pain of 1/10 in order to progress toward max functional ability and improve quality of life [x] Progressing  [] Met  [] Not Met     FUNCTION: Patient will demonstrate improved function as indicated by a functional score of greater than or equal to predicted score on FOTO. [x] Progressing  [] Met  [] Not Met     MOBILITY: Patient will improve AROM to stated goals, listed in objective measures above, in order to return to maximal functional potential and improve  quality of life.  [x] Progressing  [] Met  [] Not Met     STRENGTH: Patient will improve strength to stated goals, listed in objective measures above, in order to improve functional independence and quality of life.  [x] Progressing  [] Met  [] Not Met     GAIT: Patient will demonstrate normalized gait mechanics with minimal compensation in order to return to PLOF. [x] Progressing  [] Met  [] Not Met     Patient will return to normal ADL's, IADL's, community involvement, recreational activities, and work-related activities with less than or equal to 1/10 pain and maximal function.  [x] Progressing  [] Met  [] Not Met           Zoran Bradford, PT

## 2025-06-27 NOTE — PROGRESS NOTES
Outpatient Rehab    Physical Therapy Visit    Patient Name: Pat Damon  MRN: [id number]  YOB: 1950  Encounter Date: 6/25/2025    Therapy Diagnosis: No diagnosis found.  Physician: Gema León, *    Physician Orders: Eval and Treat  Medical Diagnosis: Closed nondisplaced transverse fracture of right patella, initial encounter  Surgical Diagnosis: Not applicable for this Episode   Surgical Date: Not applicable for this Episode  Days Since Last Surgery: Not applicable for this Episode    Visit # / Visits Authorized:  3 / 8  Insurance Authorization Period: 6/13/2025 to 12/31/2025  Date of Evaluation: 6/12/2025  Plan of Care Certification: 6/12/2025 to 8/8/2025      PT/PTA:     Number of PTA visits since last PT visit:   Time In:  1330  Time Out:  1410  Total Time (in minutes):     Total Billable Time (in minutes):  45 minutes    FOTO:  Intake Score:  %  Survey Score 2:  %  Survey Score 3:  %    Precautions:       Subjective    Patient reports no problems in her knee today. The brace remains uncomfortable but she has been trying to wear it as instructed.      Pain 0/10    Objective    Objective Measures updated at progress report unless specified.         Treatment:     CPT Intervention  R Knee Duration / Intensity  6/25   MT Manual  -   NMR Quad Set 20x5s   NMR SLR 3x10   NMR SL Hip Abd 3x10   NMR Prone Hip Ext 3x10   NMR HS Isometrics 20x5s   TE HS Str 3x30s   TE Gastroc Str 3x30s   TE 4 way ankle 3x10 RTB   TE Standing Calf Raise 3x10   TE Standing 3-way hip 3x10   PLAN             CPT Codes available for Billing:   (0) minutes of Manual therapy (MT) to improve pain and ROM.  (25) minutes of Therapeutic Exercise (TE) to develop strength, endurance, range of motion, and flexibility.  (20) minutes of Neuromuscular Re-Education (NMR)  to improve: Balance, Coordination, Kinesthetic, Sense, Proprioception, and Posture.  (0) minutes of Therapeutic Activities (TA) to improve functional  performance.  (-) Unattended Electrical Stimulation (ES) for muscle performance or pain modulation.  (-) Vasopneumatic Device Therapy () for management of swelling/edema. (67525)  (-) BFR: Blood flow restriction applied during exercise  NP or (-): Not Performed    Time Entry(in minutes):       Assessment & Plan   Assessment:  Patient tolerated session without complaints.        The patient will continue to benefit from skilled outpatient physical therapy in order to address the deficits listed in the problem list on the initial evaluation, provide patient and family education, and maximize the patients level of independence in the home and community environments.     The patient's spiritual, cultural, and educational needs were considered, and the patient is agreeable to the plan of care and goals.           Plan:  Continue to progress as tolerated under current POC.      Goals:      Short Term Goals:  4 weeks Status  Date Met   PAIN: Pt will report worst pain of 3/10 in order to progress toward max functional ability and improve quality of life. [x] Progressing  [] Met  [] Not Met     FUNCTION: Patient will demonstrate improved function as indicated by a functional score of greater than or equal to 50% predicted on FOTO. [x] Progressing  [] Met  [] Not Met     HEP: Patient will demonstrate independence with HEP in order to progress toward functional independence. [x] Progressing  [] Met  [] Not Met        Long Term Goals:  8 weeks Status Date Met   PAIN: Pt will report worst pain of 1/10 in order to progress toward max functional ability and improve quality of life [x] Progressing  [] Met  [] Not Met     FUNCTION: Patient will demonstrate improved function as indicated by a functional score of greater than or equal to predicted score on FOTO. [x] Progressing  [] Met  [] Not Met     MOBILITY: Patient will improve AROM to stated goals, listed in objective measures above, in order to return to maximal functional  potential and improve quality of life.  [x] Progressing  [] Met  [] Not Met     STRENGTH: Patient will improve strength to stated goals, listed in objective measures above, in order to improve functional independence and quality of life.  [x] Progressing  [] Met  [] Not Met     GAIT: Patient will demonstrate normalized gait mechanics with minimal compensation in order to return to PLOF. [x] Progressing  [] Met  [] Not Met     Patient will return to normal ADL's, IADL's, community involvement, recreational activities, and work-related activities with less than or equal to 1/10 pain and maximal function.  [x] Progressing  [] Met  [] Not Met          Zoran Bradford, PT

## 2025-06-30 ENCOUNTER — CLINICAL SUPPORT (OUTPATIENT)
Dept: REHABILITATION | Facility: HOSPITAL | Age: 75
End: 2025-06-30
Payer: MEDICARE

## 2025-06-30 DIAGNOSIS — M25.561 ACUTE PAIN OF RIGHT KNEE: Primary | ICD-10-CM

## 2025-06-30 PROCEDURE — 97112 NEUROMUSCULAR REEDUCATION: CPT | Mod: PN

## 2025-06-30 PROCEDURE — 97110 THERAPEUTIC EXERCISES: CPT | Mod: PN

## 2025-07-01 NOTE — PROGRESS NOTES
Outpatient Rehab    Physical Therapy Visit    Patient Name: Pat Damon  MRN: [id number]  YOB: 1950  Encounter Date: 6/30/2025    Therapy Diagnosis:   Encounter Diagnosis   Name Primary?    Acute pain of right knee Yes     Physician: Gema León, *    Physician Orders: Eval and Treat  Medical Diagnosis: Closed nondisplaced transverse fracture of right patella, initial encounter  Surgical Diagnosis: Not applicable for this Episode   Surgical Date: Not applicable for this Episode  Days Since Last Surgery: Not applicable for this Episode    Visit # / Visits Authorized:  4 / 8  Insurance Authorization Period: 6/13/2025 to 12/31/2025  Date of Evaluation: 6/12/2025  Plan of Care Certification: 6/12/2025 to 8/8/2025      PT/PTA:     Number of PTA visits since last PT visit:   Time In:  1530  Time Out:  1620  Total Time (in minutes):     Total Billable Time (in minutes):  40 minutes    FOTO:  Intake Score:  %  Survey Score 2:  %  Survey Score 3:  %    Precautions:       Subjective    Patient reports no pain today but she did have pain in her foot over the weekend from being on it more.      Pain 0/10    Objective    Objective Measures updated at progress report unless specified.         Treatment:     CPT Intervention  R Knee Duration / Intensity  6/30   MT Manual     NMR Quad Set 20x5s   NMR SLR 3x10   NMR SL Hip Abd 3x10   NMR Prone Hip Ext dc   NMR HS Isometrics 20x5s   TE HS Str 3x30s   TE Gastroc Str 5x30s   TE 4 way ankle 3x10 RTB   TE Standing Calf Raise  -   TE Standing 3-way hip -           PLAN             CPT Codes available for Billing:   (0) minutes of Manual therapy (MT) to improve pain and ROM.  (15) minutes of Therapeutic Exercise (TE) to develop strength, endurance, range of motion, and flexibility.  (25) minutes of Neuromuscular Re-Education (NMR)  to improve: Balance, Coordination, Kinesthetic, Sense, Proprioception, and Posture.  (0) minutes of Therapeutic Activities (TA) to improve  functional performance.  (-) Unattended Electrical Stimulation (ES) for muscle performance or pain modulation.  (-) Vasopneumatic Device Therapy () for management of swelling/edema. (08815)  (-) BFR: Blood flow restriction applied during exercise  NP or (-): Not Performed    Time Entry(in minutes):       Assessment & Plan   Assessment:  Held standing exercises due to foot pain. Will resume next visit. Progressed knee ROM today per protocol. Patient is progressing well.         The patient will continue to benefit from skilled outpatient physical therapy in order to address the deficits listed in the problem list on the initial evaluation, provide patient and family education, and maximize the patients level of independence in the home and community environments.     The patient's spiritual, cultural, and educational needs were considered, and the patient is agreeable to the plan of care and goals.           Plan:  Continue to progress as tolerated under current POC.      Goals:      Short Term Goals:  4 weeks Status  Date Met   PAIN: Pt will report worst pain of 3/10 in order to progress toward max functional ability and improve quality of life. [x] Progressing  [] Met  [] Not Met     FUNCTION: Patient will demonstrate improved function as indicated by a functional score of greater than or equal to 50% predicted on FOTO. [x] Progressing  [] Met  [] Not Met     HEP: Patient will demonstrate independence with HEP in order to progress toward functional independence. [x] Progressing  [] Met  [] Not Met        Long Term Goals:  8 weeks Status Date Met   PAIN: Pt will report worst pain of 1/10 in order to progress toward max functional ability and improve quality of life [x] Progressing  [] Met  [] Not Met     FUNCTION: Patient will demonstrate improved function as indicated by a functional score of greater than or equal to predicted score on FOTO. [x] Progressing  [] Met  [] Not Met     MOBILITY: Patient will improve  AROM to stated goals, listed in objective measures above, in order to return to maximal functional potential and improve quality of life.  [x] Progressing  [] Met  [] Not Met     STRENGTH: Patient will improve strength to stated goals, listed in objective measures above, in order to improve functional independence and quality of life.  [x] Progressing  [] Met  [] Not Met     GAIT: Patient will demonstrate normalized gait mechanics with minimal compensation in order to return to PLOF. [x] Progressing  [] Met  [] Not Met     Patient will return to normal ADL's, IADL's, community involvement, recreational activities, and work-related activities with less than or equal to 1/10 pain and maximal function.  [x] Progressing  [] Met  [] Not Met          Zoran Bradford, PT

## 2025-07-02 ENCOUNTER — CLINICAL SUPPORT (OUTPATIENT)
Dept: REHABILITATION | Facility: HOSPITAL | Age: 75
End: 2025-07-02
Payer: MEDICARE

## 2025-07-02 DIAGNOSIS — M25.561 ACUTE PAIN OF RIGHT KNEE: Primary | ICD-10-CM

## 2025-07-02 PROCEDURE — 97110 THERAPEUTIC EXERCISES: CPT | Mod: PN

## 2025-07-02 PROCEDURE — 97112 NEUROMUSCULAR REEDUCATION: CPT | Mod: PN

## 2025-07-03 NOTE — PROGRESS NOTES
Outpatient Rehab    Physical Therapy Visit    Patient Name: Pat Damon  MRN: [id number]  YOB: 1950  Encounter Date: 7/2/2025    Therapy Diagnosis:   Encounter Diagnosis   Name Primary?    Acute pain of right knee Yes       Physician: Gema León, *    Physician Orders: Eval and Treat  Medical Diagnosis: Closed nondisplaced transverse fracture of right patella, initial encounter  Surgical Diagnosis: Not applicable for this Episode   Surgical Date: Not applicable for this Episode  Days Since Last Surgery: Not applicable for this Episode    Visit # / Visits Authorized:  6 / 8  Insurance Authorization Period: 6/13/2025 to 12/31/2025  Date of Evaluation: 6/12/2025  Plan of Care Certification: 6/12/2025 to 8/8/2025      PT/PTA:     Number of PTA visits since last PT visit:   Time In:  1530  Time Out:  1625  Total Time (in minutes):     Total Billable Time (in minutes):  45 minutes    FOTO:  Intake Score:  %  Survey Score 2:  %  Survey Score 3:  %    Precautions:       Subjective    Patient reports her foot is feeling better. Her main issue is with the brace at this time.      Pain 0/10    Objective    Objective Measures updated at progress report unless specified.       Knee AROM/PROM Right Left Goal   Knee Flexion (135º) 60 135 135   Knee Extension (0º) 0 0 0         Treatment:         CPT Intervention  R Knee Duration / Intensity  7/2   MT Manual -   NMR Quad Set 20x5s   NMR SLR 3x10   NMR SL Hip Abd 3x10   NMR Active heel slides 20x5s   TE HS Str 3x30s   TE Gastroc Str 5x30s   TE 4 way ankle 3x10 RTB   TE Standing Calf Raise 3x10   TE Standing 3-way hip 3x10           PLAN             CPT Codes available for Billing:   (0) minutes of Manual therapy (MT) to improve pain and ROM.  (25) minutes of Therapeutic Exercise (TE) to develop strength, endurance, range of motion, and flexibility.  (20) minutes of Neuromuscular Re-Education (NMR)  to improve: Balance, Coordination, Kinesthetic, Sense,  Proprioception, and Posture.  (0) minutes of Therapeutic Activities (TA) to improve functional performance.  (-) Unattended Electrical Stimulation (ES) for muscle performance or pain modulation.  (-) Vasopneumatic Device Therapy () for management of swelling/edema. (01033)  (-) BFR: Blood flow restriction applied during exercise  NP or (-): Not Performed    Time Entry(in minutes):       Assessment & Plan   Assessment:  Patient is progressing well and able to tolerate progressing knee flexion to 60 degrees. She remains limited at this time due to ROM precautions.      The patient will continue to benefit from skilled outpatient physical therapy in order to address the deficits listed in the problem list on the initial evaluation, provide patient and family education, and maximize the patients level of independence in the home and community environments.     The patient's spiritual, cultural, and educational needs were considered, and the patient is agreeable to the plan of care and goals.           Plan:  Continue to progress as tolerated under current POC.      Goals:      Short Term Goals:  4 weeks Status  Date Met   PAIN: Pt will report worst pain of 3/10 in order to progress toward max functional ability and improve quality of life. [] Progressing  [x] Met  [] Not Met     FUNCTION: Patient will demonstrate improved function as indicated by a functional score of greater than or equal to 50% predicted on FOTO. [x] Progressing  [] Met  [] Not Met     HEP: Patient will demonstrate independence with HEP in order to progress toward functional independence. [] Progressing  [x] Met  [] Not Met        Long Term Goals:  8 weeks Status Date Met   PAIN: Pt will report worst pain of 1/10 in order to progress toward max functional ability and improve quality of life [x] Progressing  [] Met  [] Not Met     FUNCTION: Patient will demonstrate improved function as indicated by a functional score of greater than or equal to  predicted score on FOTO. [x] Progressing  [] Met  [] Not Met     MOBILITY: Patient will improve AROM to stated goals, listed in objective measures above, in order to return to maximal functional potential and improve quality of life.  [x] Progressing  [] Met  [] Not Met     STRENGTH: Patient will improve strength to stated goals, listed in objective measures above, in order to improve functional independence and quality of life.  [x] Progressing  [] Met  [] Not Met     GAIT: Patient will demonstrate normalized gait mechanics with minimal compensation in order to return to PLOF. [x] Progressing  [] Met  [] Not Met     Patient will return to normal ADL's, IADL's, community involvement, recreational activities, and work-related activities with less than or equal to 1/10 pain and maximal function.  [x] Progressing  [] Met  [] Not Met          Zoran Bradford, PT

## 2025-07-07 ENCOUNTER — CLINICAL SUPPORT (OUTPATIENT)
Dept: REHABILITATION | Facility: HOSPITAL | Age: 75
End: 2025-07-07
Payer: MEDICARE

## 2025-07-07 DIAGNOSIS — M25.561 ACUTE PAIN OF RIGHT KNEE: Primary | ICD-10-CM

## 2025-07-07 PROCEDURE — 97110 THERAPEUTIC EXERCISES: CPT | Mod: PN

## 2025-07-07 PROCEDURE — 97112 NEUROMUSCULAR REEDUCATION: CPT | Mod: PN

## 2025-07-09 ENCOUNTER — CLINICAL SUPPORT (OUTPATIENT)
Dept: REHABILITATION | Facility: HOSPITAL | Age: 75
End: 2025-07-09
Payer: MEDICARE

## 2025-07-09 DIAGNOSIS — M25.561 ACUTE PAIN OF RIGHT KNEE: Primary | ICD-10-CM

## 2025-07-09 PROCEDURE — 97112 NEUROMUSCULAR REEDUCATION: CPT | Mod: PN

## 2025-07-09 PROCEDURE — 97110 THERAPEUTIC EXERCISES: CPT | Mod: PN

## 2025-07-14 ENCOUNTER — CLINICAL SUPPORT (OUTPATIENT)
Dept: REHABILITATION | Facility: HOSPITAL | Age: 75
End: 2025-07-14
Payer: MEDICARE

## 2025-07-14 DIAGNOSIS — M25.561 ACUTE PAIN OF RIGHT KNEE: Primary | ICD-10-CM

## 2025-07-14 PROCEDURE — 97110 THERAPEUTIC EXERCISES: CPT | Mod: PN

## 2025-07-14 PROCEDURE — 97112 NEUROMUSCULAR REEDUCATION: CPT | Mod: PN

## 2025-07-14 NOTE — PROGRESS NOTES
"  Outpatient Rehab    Physical Therapy Visit    Patient Name: Pat Damon  MRN: [id number]  YOB: 1950  Encounter Date: 7/14/2025    Therapy Diagnosis:   Encounter Diagnosis   Name Primary?    Acute pain of right knee Yes       Physician: Gema León, *    Physician Orders: Eval and Treat  Medical Diagnosis: Closed nondisplaced transverse fracture of right patella, initial encounter  Surgical Diagnosis: Not applicable for this Episode   Surgical Date: Not applicable for this Episode  Days Since Last Surgery: Not applicable for this Episode    Visit # / Visits Authorized:  9 / 20  Insurance Authorization Period: 6/13/2025 to 12/31/2025  Date of Evaluation: 6/12/2025  Plan of Care Certification: 6/12/2025 to 8/8/2025      PT/PTA:     Number of PTA visits since last PT visit:   Time In:  1530  Time Out:  1625  Total Time (in minutes):     Total Billable Time (in minutes):  45 minutes    FOTO:  Intake Score:  %  Survey Score 2:  %  Survey Score 3:  %    Precautions:       Subjective    Patient reports No new complaints.        Pain 0/10    Objective    Objective Measures updated at progress report unless specified.         Treatment:         CPT Intervention  R Knee Duration / Intensity  7/14   MT Manual  -   NMR Quad Set 20x5s   NMR SLR 3x10   NMR SL Hip Abd 3x10 3#   NMR Active heel slides 20x5s   TE HS Str 3x30"   TE Gastroc Str 5x30s   TE 4 way ankle 3x10 GTB   TE Standing Calf Raise 3x10   TE Standing 3-way hip 3x10           PLAN             CPT Codes available for Billing:   (0) minutes of Manual therapy (MT) to improve pain and ROM.  (25) minutes of Therapeutic Exercise (TE) to develop strength, endurance, range of motion, and flexibility.  (20) minutes of Neuromuscular Re-Education (NMR)  to improve: Balance, Coordination, Kinesthetic, Sense, Proprioception, and Posture.  (0) minutes of Therapeutic Activities (TA) to improve functional performance.  (-) Unattended Electrical Stimulation (ES) " for muscle performance or pain modulation.  (-) Vasopneumatic Device Therapy () for management of swelling/edema. (59170)  (-) BFR: Blood flow restriction applied during exercise  NP or (-): Not Performed    Time Entry(in minutes):       Assessment & Plan   Assessment:  Patient tolerated session well.        The patient will continue to benefit from skilled outpatient physical therapy in order to address the deficits listed in the problem list on the initial evaluation, provide patient and family education, and maximize the patients level of independence in the home and community environments.     The patient's spiritual, cultural, and educational needs were considered, and the patient is agreeable to the plan of care and goals.           Plan:  Continue to progress as tolerated under current POC.      Goals:      Short Term Goals:  4 weeks Status  Date Met   PAIN: Pt will report worst pain of 3/10 in order to progress toward max functional ability and improve quality of life. [] Progressing  [x] Met  [] Not Met     FUNCTION: Patient will demonstrate improved function as indicated by a functional score of greater than or equal to 50% predicted on FOTO. [x] Progressing  [] Met  [] Not Met     HEP: Patient will demonstrate independence with HEP in order to progress toward functional independence. [] Progressing  [x] Met  [] Not Met        Long Term Goals:  8 weeks Status Date Met   PAIN: Pt will report worst pain of 1/10 in order to progress toward max functional ability and improve quality of life [x] Progressing  [] Met  [] Not Met     FUNCTION: Patient will demonstrate improved function as indicated by a functional score of greater than or equal to predicted score on FOTO. [x] Progressing  [] Met  [] Not Met     MOBILITY: Patient will improve AROM to stated goals, listed in objective measures above, in order to return to maximal functional potential and improve quality of life.  [x] Progressing  [] Met  [] Not  Met     STRENGTH: Patient will improve strength to stated goals, listed in objective measures above, in order to improve functional independence and quality of life.  [x] Progressing  [] Met  [] Not Met     GAIT: Patient will demonstrate normalized gait mechanics with minimal compensation in order to return to PLOF. [x] Progressing  [] Met  [] Not Met     Patient will return to normal ADL's, IADL's, community involvement, recreational activities, and work-related activities with less than or equal to 1/10 pain and maximal function.  [x] Progressing  [] Met  [] Not Met          Zoran Bradford, PT            Outpatient Rehab    Physical Therapy Visit    Patient Name: Pat Damon  MRN: [id number]  YOB: 1950  Encounter Date: 7/14/2025    Therapy Diagnosis:   Encounter Diagnosis   Name Primary?    Acute pain of right knee Yes       Physician: Gema León, *    Physician Orders: Eval and Treat  Medical Diagnosis: Closed nondisplaced transverse fracture of right patella, initial encounter  Surgical Diagnosis: Not applicable for this Episode   Surgical Date: Not applicable for this Episode  Days Since Last Surgery: Not applicable for this Episode    Visit # / Visits Authorized:  9 / 20  Insurance Authorization Period: 6/13/2025 to 12/31/2025  Date of Evaluation: 6/12/2025  Plan of Care Certification: 6/12/2025 to 8/8/2025      PT/PTA:     Number of PTA visits since last PT visit:   Time In:  1530  Time Out:  1625  Total Time (in minutes):     Total Billable Time (in minutes):  50 minutes    FOTO:  Intake Score:  %  Survey Score 2:  %  Survey Score 3:  %    Precautions:       Subjective    Patient reports her foot is feeling better. Her main issue is with the brace at this time.      Pain 0/10    Objective    Objective Measures updated at progress report unless specified.       Knee AROM/PROM Right Left Goal   Knee Flexion (135º) 60 135 135   Knee Extension (0º) 0 0 0         Treatment:      CPT  "Intervention  R Knee Duration / Intensity  7/14   MT Manual     NMR Quad Set 20x5s   NMR SLR 3x10   NMR SL Hip Abd 3x10 3#   NMR Active heel slides 20x5s   TE HS Str 3x30"   TE Gastroc Str 5x30s   TE 4 way ankle 3x10 GTB   TE  Prone Quad Str     TE Standing Calf Raise 3x10   TE Standing 3-way hip 3x10   PLAN             CPT Codes available for Billing:   (0) minutes of Manual therapy (MT) to improve pain and ROM.  (30) minutes of Therapeutic Exercise (TE) to develop strength, endurance, range of motion, and flexibility.  (20) minutes of Neuromuscular Re-Education (NMR)  to improve: Balance, Coordination, Kinesthetic, Sense, Proprioception, and Posture.  (0) minutes of Therapeutic Activities (TA) to improve functional performance.  (-) Unattended Electrical Stimulation (ES) for muscle performance or pain modulation.  (-) Vasopneumatic Device Therapy () for management of swelling/edema. (29039)  (-) BFR: Blood flow restriction applied during exercise  NP or (-): Not Performed       Time Entry(in minutes):       Assessment & Plan   Assessment:  Patient is progressing well and able to tolerate progressing knee flexion to 60 degrees. She remains limited at this time due to ROM precautions.      The patient will continue to benefit from skilled outpatient physical therapy in order to address the deficits listed in the problem list on the initial evaluation, provide patient and family education, and maximize the patients level of independence in the home and community environments.     The patient's spiritual, cultural, and educational needs were considered, and the patient is agreeable to the plan of care and goals.           Plan:  Continue to progress as tolerated under current POC.      Goals:      Short Term Goals:  4 weeks Status  Date Met   PAIN: Pt will report worst pain of 3/10 in order to progress toward max functional ability and improve quality of life. [] Progressing  [x] Met  [] Not Met     FUNCTION: " Patient will demonstrate improved function as indicated by a functional score of greater than or equal to 50% predicted on FOTO. [x] Progressing  [] Met  [] Not Met     HEP: Patient will demonstrate independence with HEP in order to progress toward functional independence. [] Progressing  [x] Met  [] Not Met        Long Term Goals:  8 weeks Status Date Met   PAIN: Pt will report worst pain of 1/10 in order to progress toward max functional ability and improve quality of life [x] Progressing  [] Met  [] Not Met     FUNCTION: Patient will demonstrate improved function as indicated by a functional score of greater than or equal to predicted score on FOTO. [x] Progressing  [] Met  [] Not Met     MOBILITY: Patient will improve AROM to stated goals, listed in objective measures above, in order to return to maximal functional potential and improve quality of life.  [x] Progressing  [] Met  [] Not Met     STRENGTH: Patient will improve strength to stated goals, listed in objective measures above, in order to improve functional independence and quality of life.  [x] Progressing  [] Met  [] Not Met     GAIT: Patient will demonstrate normalized gait mechanics with minimal compensation in order to return to PLOF. [x] Progressing  [] Met  [] Not Met     Patient will return to normal ADL's, IADL's, community involvement, recreational activities, and work-related activities with less than or equal to 1/10 pain and maximal function.  [x] Progressing  [] Met  [] Not Met          Zoran Bradford, PT

## 2025-07-17 ENCOUNTER — OFFICE VISIT (OUTPATIENT)
Dept: SPORTS MEDICINE | Facility: CLINIC | Age: 75
End: 2025-07-17
Payer: MEDICARE

## 2025-07-17 ENCOUNTER — HOSPITAL ENCOUNTER (OUTPATIENT)
Dept: RADIOLOGY | Facility: HOSPITAL | Age: 75
Discharge: HOME | End: 2025-07-17
Attending: ORTHOPAEDIC SURGERY
Payer: MEDICARE

## 2025-07-17 DIAGNOSIS — M25.561 RIGHT KNEE PAIN, UNSPECIFIED CHRONICITY: ICD-10-CM

## 2025-07-17 DIAGNOSIS — M25.561 RIGHT KNEE PAIN, UNSPECIFIED CHRONICITY: Primary | ICD-10-CM

## 2025-07-17 DIAGNOSIS — S82.091A OTHER CLOSED FRACTURE OF RIGHT PATELLA, INITIAL ENCOUNTER: ICD-10-CM

## 2025-07-17 PROCEDURE — 99999 PR PBB SHADOW E&M-EST. PATIENT-LVL II: CPT | Mod: PBBFAC,,, | Performed by: ORTHOPAEDIC SURGERY

## 2025-07-17 PROCEDURE — 99212 OFFICE O/P EST SF 10 MIN: CPT | Mod: PBBFAC,25,PN | Performed by: ORTHOPAEDIC SURGERY

## 2025-07-17 PROCEDURE — 73560 X-RAY EXAM OF KNEE 1 OR 2: CPT | Mod: TC,PN,RT

## 2025-07-17 PROCEDURE — 99213 OFFICE O/P EST LOW 20 MIN: CPT | Mod: S$PBB,,, | Performed by: ORTHOPAEDIC SURGERY

## 2025-07-17 PROCEDURE — 73560 X-RAY EXAM OF KNEE 1 OR 2: CPT | Mod: 26,RT,, | Performed by: STUDENT IN AN ORGANIZED HEALTH CARE EDUCATION/TRAINING PROGRAM

## 2025-07-17 NOTE — PATIENT INSTRUCTIONS
Assessment:  Pat Damon is a  74 y.o. 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    Right knee pain, unspecified chronicity Yes    Other closed fracture of right patella, initial encounter         Plan:  Continue PT with Zoran at [facility] following non-op patella fracture protocol   Okay to discontinue brace at this time   Okay to begin working on full range of motion in physical therapy    No kneeling, climbing stairs at this time     Follow-up: 6 weeks with repeat xrays( AP/Lateral). Please reach out to us sooner if there are any problems between now and then.    About Dr. Tani Freire's Research & Publications    Give us Feedback:   Google: Leave Google Review  Healthgrades: Leave Healthgrades Review

## 2025-07-17 NOTE — PROGRESS NOTES
Patient ID: Pat Damon  YOB: 1950  MRN: [id number]    Chief Complaint: Pain of the Right Knee    Referred By: Walk-in    History of Present Illness: Pat Damon is an established patient 74 y.o. 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 Initially misdiagnosed, she walked on it for two weeks before proper diagnosis and treatment.    Currently, she reports minimal pain, rating it as 2 or 3 out of 10 when walking. She experiences no pain when walking in the brace. However, she experiences slight discomfort just below her knee during short walks without the brace.    She has been attending PT with [facility] and is concerned about pushing too far. She hopes the fracture is healing properly. To support bone health, she has been taking calcium, K2, biotin, and vitamin D supplements, noting she has osteoporosis.    Currently using a knee brace, she is eager to discontinue its use but does not want to do so prematurely. She inquires about limitations on her activities, specifically mentioning walking around the house and kneeling in [employer].    She denies any tendon issues. Pat has been using a knee brace for 8 weeks since the injury  PT with [facility] for approximately 8 weeks, with flexion reaching about 60 degrees    MEDICATIONS:  - Calcium supplement  - Vitamin K2 supplement  - Biotin supplement  - Vitamin D supplement      ROS:  Musculoskeletal: +limb pain, +pain with movement, +limited movement         Past Medical History:   Past Medical  History:   Diagnosis Date    Asthma in remission     Breast cancer 2014    Essential (primary) hypertension     Hypercholesterolemia     Melanoma     Migraines     Pulsatile tinnitus     SVT (supraventricular tachycardia)      Past Surgical History:   Procedure Laterality Date    BREAST LUMPECTOMY      CHOLECYSTECTOMY      EXCISION OF MELANOMA      TONSILLECTOMY      TURBINATE RESECTION       Family History   Problem Relation Name Age of Onset    Hypertension Mother      Heart disease Mother       Social History[1]  Medication List with Changes/Refills   Current Medications    ASCORBIC ACID, VITAMIN C, (VITAMIN C) 1000 MG TABLET    Take 1,000 mg by mouth.    ASPIRIN (ECOTRIN) 81 MG EC TABLET    Take 1 tablet by mouth every morning.    BUTALBITAL-ACETAMINOPHEN-CAFF -40 MG CAP        DESLORATADINE (CLARINEX) 5 MG TABLET    Take 1 tablet (5 mg total) by mouth 2 (two) times a day.    DICLOFENAC SODIUM (VOLTAREN) 1 % GEL    Apply 2 g topically 3 (three) times daily.    METOPROLOL TARTRATE (LOPRESSOR) 25 MG TABLET        MULTIVITAMIN WITH FOLIC ACID 400 MCG TAB    Take 1 tablet by mouth once daily.    OMEGA 3-DHA-EPA-FISH OIL (OMEGA-3) 350 MG-235 MG- 90 MG-597 MG CPDR        PHYTONADIONE, VIT K1, (PHYTONADIONE, VITAMIN K1,) 100 MCG TAB    Take 100 mcg by mouth.    ROSUVASTATIN (CRESTOR) 5 MG TABLET        ZENPEP 40,000-126,000- 168,000 UNIT CPDR         Review of patient's allergies indicates:   Allergen Reactions    Phenytoin sodium extended      Other reaction(s): tachycardia  Other reaction(s): tachycardia     ROS    Physical Exam:   There is no height or weight on file to calculate BMI.  There were no vitals filed for this visit.   GENERAL: Well appearing, appropriate for stated age, no acute distress.  CARDIOVASCULAR: Pulses regular by peripheral palpation.  PULMONARY: Respirations are even and non-labored.  NEURO: Awake, alert, and oriented x 3.  PSYCH: Mood & affect are appropriate.  HEENT: Head is  normocephalic and atraumatic.  Ortho/SPM Exam    Physical Exam    Right Knee: KNEE FLEXION TO 90 DEGREES.  IMAGING:  - XR Knee: day of injury at the ER, showed a stress fracture of the patella, though initially misdiagnosed as no fracture  - XR Knee:  (approximately 2.5 weeks after injury), showed a fracture line through the bottom of the patella  - XR Knee: day of the visit (8 weeks post-injury), shows significant healing compared to the  XR, with the fracture line barely visible       Right Knee Exam      Tenderness   The patient is tender to palpation of the patella.     Range of Motion   Extension:  0   Flexion:  90      Tests   Ligament Examination   Lachman: normal (-1 to 2mm)   MCL - Valgus: normal (0 to 2mm)  LCL - Varus: normal     Other   Sensation: normal     Comments:  No extension lag  Intact EHL, FHL, gastrocsoleus, and tibialis anterior. Sensation intact to light touch in superficial peroneal, deep peroneal, tibial, sural, and saphenous nerve distributions. Foot warm and well perfused with capillary refill of less than 2 seconds and palpable pedal pulses.        Muscle Strength   Right Lower Extremity   Hip Abduction: 5/5   Quadriceps:  5/5   Hamstrin/5      Vascular Exam      Right Pulses  Dorsalis Pedis:      2+  Posterior Tibial:      2+    Imaging:    X-Ray Knee 1 or 2 View Right  Narrative: EXAMINATION:  XR KNEE 1 OR 2 VIEW RIGHT    CLINICAL HISTORY:  Pain in right knee    TECHNIQUE:  XR KNEE 1 OR 2 VIEW RIGHT    COMPARISON:  2025.    FINDINGS:  No evidence of acute fracture or dislocation.  Mild medial compartment joint space narrowing.  Mild atherosclerotic disease.  Impression: As above.    Electronically signed by: Devon Ricks  Date:    2025  Time:    16:05      Relevant imaging results reviewed and interpreted by me, discussed with the patient and / or family today.     Other Tests:     Assessment & Plan    PLAN SUMMARY:   Discontinue use of knee brace    Begin working on full range of motion   Avoid kneeling, climbing ladders, and activities at heights   Do not lift heavy objects   Continue normal walking   Ordered XR Knee in 6 weeks for final evaluation   Follow up in 6 weeks, scheduled for August 25th at 4:00 PM    LEFT PATELLA FRACTURE:   Discontinue use of knee brace.   Begin working on full range of motion.   Avoid kneeling.   Do not lift heavy objects.   Avoid climbing ladders or activities at heights.   Continue normal walking.   Ordered XR Knee in 6 weeks for final evaluation.    AGE-RELATED OSTEOPOROSIS:   Avoid climbing ladders or activities at heights.    FOLLOW-UP:   Follow up in 6 weeks.   Scheduled for August 25th at 4:00 PM.         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    Right knee pain, unspecified chronicity Yes    Other closed fracture of right patella, initial encounter         Plan:  Continue PT with Zoran at [facility] following non-op patella fracture protocol   Okay to discontinue brace at this time   Okay to begin working on full range of motion in physical therapy    No kneeling, climbing stairs at this time     Follow-up: 6 weeks with repeat xrays( AP/Lateral). Please reach out to us sooner if there are any problems between now and then.    About Dr. Tani Freire's Research & Publications    Give us Feedback:   Google: Leave Google Review  Healthgrades: Leave Healthgrades Review       Provider Note/Medical Decision Making:     I discussed worrisome and red flag signs and symptoms with the patient. The patient expressed understanding and agreed to alert me immediately or to go to the emergency room if they experience any of these. Treatment plan was developed with input from the patient/family, and they expressed understanding  and agreement with the plan. All questions were answered today.          Levi Freire MD  Board Certified in Orthopaedic Surgery & Sports Medicine   Regional Section Head of Orthopedic Surgery & Sports Medicine  [facility]      Disclaimer: This note was prepared using a voice recognition system and is likely to have sound alike errors within the text.     This note was generated with the assistance of ambient listening technology. Verbal consent was obtained by the patient and accompanying visitor(s) for the recording of patient appointment to facilitate this note. I attest to having reviewed and edited the generated note for accuracy, though some syntax or spelling errors may persist. Please contact the author of this note for any clarification.           [1]   Social History  Socioeconomic History    Marital status: Single   Tobacco Use    Smoking status: Never    Smokeless tobacco: Never   Substance and Sexual Activity    Alcohol use: Not Currently     Alcohol/week: 1.0 standard drink of alcohol     Types: 1 Glasses of wine per week    Drug use: No    Sexual activity: Not Currently

## 2025-07-21 ENCOUNTER — CLINICAL SUPPORT (OUTPATIENT)
Dept: REHABILITATION | Facility: HOSPITAL | Age: 75
End: 2025-07-21
Payer: MEDICARE

## 2025-07-21 DIAGNOSIS — M25.561 ACUTE PAIN OF RIGHT KNEE: Primary | ICD-10-CM

## 2025-07-21 PROCEDURE — 97110 THERAPEUTIC EXERCISES: CPT | Mod: PN

## 2025-07-21 PROCEDURE — 97112 NEUROMUSCULAR REEDUCATION: CPT | Mod: PN

## 2025-07-21 PROCEDURE — 97140 MANUAL THERAPY 1/> REGIONS: CPT | Mod: PN

## 2025-07-21 NOTE — PROGRESS NOTES
"  Outpatient Rehab    Physical Therapy Visit    Patient Name: Pat Damon  MRN: [id number]  YOB: 1950  Encounter Date: 7/9/2025    Therapy Diagnosis:   No diagnosis found.      Physician: Gema León, *    Physician Orders: Eval and Treat  Medical Diagnosis: Closed nondisplaced transverse fracture of right patella, initial encounter  Surgical Diagnosis: Not applicable for this Episode   Surgical Date: Not applicable for this Episode  Days Since Last Surgery: Not applicable for this Episode    Visit # / Visits Authorized:  8 / 20  Insurance Authorization Period: 6/13/2025 to 12/31/2025  Date of Evaluation: 6/12/2025  Plan of Care Certification: 6/12/2025 to 8/8/2025      PT/PTA:     Number of PTA visits since last PT visit:   Time In:  1600  Time Out:  1700  Total Time (in minutes):     Total Billable Time (in minutes):  45 minutes    FOTO:  Intake Score:  %  Survey Score 2:  %  Survey Score 3:  %    Precautions:       Subjective    Patient reports no new complaints     Pain 0/10    Objective    Objective Measures updated at progress report unless specified.         Treatment:      CPT Intervention  R Knee Duration / Intensity  7/9   MT Manual  -   NMR Quad Set 20x5s   NMR SLR 3x10   NMR SL Hip Abd 3x10   NMR Active heel slides 20x5s   TE HS Str 3x30"   TE Gastroc Str 5x30s   TE 4 way ankle 3x10 RTB   TE Standing Calf Raise 3x10   TE Standing 3-way hip 3x10   PLAN             CPT Codes available for Billing:   (0) minutes of Manual therapy (MT) to improve pain and ROM.  (25) minutes of Therapeutic Exercise (TE) to develop strength, endurance, range of motion, and flexibility.  (20) minutes of Neuromuscular Re-Education (NMR)  to improve: Balance, Coordination, Kinesthetic, Sense, Proprioception, and Posture.  (0) minutes of Therapeutic Activities (TA) to improve functional performance.  (-) Unattended Electrical Stimulation (ES) for muscle performance or pain modulation.  (-) Vasopneumatic Device " Therapy () for management of swelling/edema. (70444)  (-) BFR: Blood flow restriction applied during exercise  NP or (-): Not Performed       Time Entry(in minutes):       Assessment & Plan   Assessment:  Patient tolerated session well.      The patient will continue to benefit from skilled outpatient physical therapy in order to address the deficits listed in the problem list on the initial evaluation, provide patient and family education, and maximize the patients level of independence in the home and community environments.     The patient's spiritual, cultural, and educational needs were considered, and the patient is agreeable to the plan of care and goals.           Plan:  Continue to progress as tolerated under current POC.      Goals:      Short Term Goals:  4 weeks Status  Date Met   PAIN: Pt will report worst pain of 3/10 in order to progress toward max functional ability and improve quality of life. [] Progressing  [x] Met  [] Not Met     FUNCTION: Patient will demonstrate improved function as indicated by a functional score of greater than or equal to 50% predicted on FOTO. [x] Progressing  [] Met  [] Not Met     HEP: Patient will demonstrate independence with HEP in order to progress toward functional independence. [] Progressing  [x] Met  [] Not Met        Long Term Goals:  8 weeks Status Date Met   PAIN: Pt will report worst pain of 1/10 in order to progress toward max functional ability and improve quality of life [x] Progressing  [] Met  [] Not Met     FUNCTION: Patient will demonstrate improved function as indicated by a functional score of greater than or equal to predicted score on FOTO. [x] Progressing  [] Met  [] Not Met     MOBILITY: Patient will improve AROM to stated goals, listed in objective measures above, in order to return to maximal functional potential and improve quality of life.  [x] Progressing  [] Met  [] Not Met     STRENGTH: Patient will improve strength to stated goals,  listed in objective measures above, in order to improve functional independence and quality of life.  [x] Progressing  [] Met  [] Not Met     GAIT: Patient will demonstrate normalized gait mechanics with minimal compensation in order to return to PLOF. [x] Progressing  [] Met  [] Not Met     Patient will return to normal ADL's, IADL's, community involvement, recreational activities, and work-related activities with less than or equal to 1/10 pain and maximal function.  [x] Progressing  [] Met  [] Not Met          Zoran Bradford, PT

## 2025-07-21 NOTE — PROGRESS NOTES
"  Outpatient Rehab    Physical Therapy Visit    Patient Name: Pat Damon  MRN: [id number]  YOB: 1950  Encounter Date: 7/7/2025    Therapy Diagnosis:   Encounter Diagnosis   Name Primary?    Acute pain of right knee Yes         Physician: Gema León, *    Physician Orders: Eval and Treat  Medical Diagnosis: Closed nondisplaced transverse fracture of right patella, initial encounter  Surgical Diagnosis: Not applicable for this Episode   Surgical Date: Not applicable for this Episode  Days Since Last Surgery: Not applicable for this Episode    Visit # / Visits Authorized:  8 / 20  Insurance Authorization Period: 6/13/2025 to 12/31/2025  Date of Evaluation: 6/12/2025  Plan of Care Certification: 6/12/2025 to 8/8/2025      PT/PTA:     Number of PTA visits since last PT visit:   Time In:  1530  Time Out:  1625  Total Time (in minutes):     Total Billable Time (in minutes):  45 minutes    FOTO:  Intake Score:  %  Survey Score 2:  %  Survey Score 3:  %    Precautions:       Subjective    Patient reports no new complaints.     Pain 0/10    Objective    Objective Measures updated at progress report unless specified.       Knee AROM/PROM Right Left Goal   Knee Flexion (135º) 60 135 135   Knee Extension (0º) 0 0 0         Treatment:      CPT Intervention  R Knee Duration / Intensity  7/7   MT Manual  -   NMR Quad Set 20x5s   NMR SLR 3x10   NMR SL Hip Abd 3x10   NMR Active heel slides 20x5s   TE HS Str 3x30"   TE Gastroc Str 5x30s   TE 4 way ankle 3x10 RTB   TE Standing Calf Raise 3x10   TE Standing 3-way hip 3x10   PLAN             CPT Codes available for Billing:   (0) minutes of Manual therapy (MT) to improve pain and ROM.  (25) minutes of Therapeutic Exercise (TE) to develop strength, endurance, range of motion, and flexibility.  (20) minutes of Neuromuscular Re-Education (NMR)  to improve: Balance, Coordination, Kinesthetic, Sense, Proprioception, and Posture.  (0) minutes of Therapeutic Activities " (TA) to improve functional performance.  (-) Unattended Electrical Stimulation (ES) for muscle performance or pain modulation.  (-) Vasopneumatic Device Therapy () for management of swelling/edema. (96370)  (-) BFR: Blood flow restriction applied during exercise  NP or (-): Not Performed       Time Entry(in minutes):       Assessment & Plan   Assessment:  Patient is progressing as expected.      The patient will continue to benefit from skilled outpatient physical therapy in order to address the deficits listed in the problem list on the initial evaluation, provide patient and family education, and maximize the patients level of independence in the home and community environments.     The patient's spiritual, cultural, and educational needs were considered, and the patient is agreeable to the plan of care and goals.           Plan:  Continue to progress as tolerated under current POC.      Goals:      Short Term Goals:  4 weeks Status  Date Met   PAIN: Pt will report worst pain of 3/10 in order to progress toward max functional ability and improve quality of life. [] Progressing  [x] Met  [] Not Met     FUNCTION: Patient will demonstrate improved function as indicated by a functional score of greater than or equal to 50% predicted on FOTO. [x] Progressing  [] Met  [] Not Met     HEP: Patient will demonstrate independence with HEP in order to progress toward functional independence. [] Progressing  [x] Met  [] Not Met        Long Term Goals:  8 weeks Status Date Met   PAIN: Pt will report worst pain of 1/10 in order to progress toward max functional ability and improve quality of life [x] Progressing  [] Met  [] Not Met     FUNCTION: Patient will demonstrate improved function as indicated by a functional score of greater than or equal to predicted score on FOTO. [x] Progressing  [] Met  [] Not Met     MOBILITY: Patient will improve AROM to stated goals, listed in objective measures above, in order to return to  maximal functional potential and improve quality of life.  [x] Progressing  [] Met  [] Not Met     STRENGTH: Patient will improve strength to stated goals, listed in objective measures above, in order to improve functional independence and quality of life.  [x] Progressing  [] Met  [] Not Met     GAIT: Patient will demonstrate normalized gait mechanics with minimal compensation in order to return to PLOF. [x] Progressing  [] Met  [] Not Met     Patient will return to normal ADL's, IADL's, community involvement, recreational activities, and work-related activities with less than or equal to 1/10 pain and maximal function.  [x] Progressing  [] Met  [] Not Met          Zoran Bradford, PT

## 2025-07-24 ENCOUNTER — CLINICAL SUPPORT (OUTPATIENT)
Dept: REHABILITATION | Facility: HOSPITAL | Age: 75
End: 2025-07-24
Payer: MEDICARE

## 2025-07-24 DIAGNOSIS — M25.561 ACUTE PAIN OF RIGHT KNEE: Primary | ICD-10-CM

## 2025-07-24 PROCEDURE — 97140 MANUAL THERAPY 1/> REGIONS: CPT | Mod: PN

## 2025-07-24 PROCEDURE — 97112 NEUROMUSCULAR REEDUCATION: CPT | Mod: PN

## 2025-07-24 PROCEDURE — 97110 THERAPEUTIC EXERCISES: CPT | Mod: PN

## 2025-07-28 ENCOUNTER — CLINICAL SUPPORT (OUTPATIENT)
Dept: REHABILITATION | Facility: HOSPITAL | Age: 75
End: 2025-07-28
Payer: MEDICARE

## 2025-07-28 DIAGNOSIS — M25.561 ACUTE PAIN OF RIGHT KNEE: Primary | ICD-10-CM

## 2025-07-28 PROCEDURE — 97112 NEUROMUSCULAR REEDUCATION: CPT | Mod: PN

## 2025-07-28 PROCEDURE — 97110 THERAPEUTIC EXERCISES: CPT | Mod: PN

## 2025-07-28 NOTE — PROGRESS NOTES
"  Outpatient Rehab    Physical Therapy Visit    Patient Name: Pat Damon  MRN: [id number]  YOB: 1950  Encounter Date: 7/28/2025    Therapy Diagnosis:   Encounter Diagnosis   Name Primary?    Acute pain of right knee Yes       Physician: Gema León, *    Physician Orders: Eval and Treat  Medical Diagnosis: Closed nondisplaced transverse fracture of right patella, initial encounter  Surgical Diagnosis: Not applicable for this Episode   Surgical Date: Not applicable for this Episode  Days Since Last Surgery: Not applicable for this Episode    Visit # / Visits Authorized:  11 / 20  Insurance Authorization Period: 6/13/2025 to 12/31/2025  Date of Evaluation: 6/12/2025  Plan of Care Certification: 6/12/2025 to 8/8/2025     Time In:  3:05 pm  Time Out:  4:05 pm  Total Time (in minutes):   60 minutes  Total Billable Time (in minutes):  45 minutes     Subjective    Patient reports soreness of R quadriceps but pain 0/10 at rest. Pain typically arises each morning and it gets to a 5/10 max. Pt wears a compression sleeve to relieve pain. She has been riding her recumbent bike at home for 5-10 minutes at a time.    Objective    Objective Measures updated at progress report unless specified.         Treatment:      CPT Intervention  R Knee Duration / Intensity  7/28   TE Recumbent bike 6'   TE Prone Quad Stretch 3x30s   NMR Active heel slides 30x with OP   NMR SAQ 3x10 3#   NMR LAQ 3x10 3#   NMR Step Ups 3x10 4"   TE Mini Squats 3x10 24"   TE Shuttle squat 3x10 4B   PLAN             CPT Codes available for Billing:   (0) minutes of Manual therapy (MT) to improve pain and ROM.  (20) minutes of Therapeutic Exercise (TE) to develop strength, endurance, range of motion, and flexibility.  (25) minutes of Neuromuscular Re-Education (NMR)  to improve: Balance, Coordination, Kinesthetic, Sense, Proprioception, and Posture.  (0) minutes of Therapeutic Activities (TA) to improve functional performance.  (-) Unattended " Electrical Stimulation (ES) for muscle performance or pain modulation.  (-) Vasopneumatic Device Therapy () for management of swelling/edema. (15857)  (-) BFR: Blood flow restriction applied during exercise  NP or (-): Not Performed    Time Entry(in minutes): 45 minutes     Assessment & Plan   Assessment:  Patient tolerated session well. Pt reported slight medial knee pain c functional strengthening exercises.         The patient will continue to benefit from skilled outpatient physical therapy in order to address the deficits listed in the problem list on the initial evaluation, provide patient and family education, and maximize the patients level of independence in the home and community environments.     The patient's spiritual, cultural, and educational needs were considered, and the patient is agreeable to the plan of care and goals.        Plan:  Continue to progress as tolerated under current POC.    Goals:      Short Term Goals:  4 weeks Status  Date Met   PAIN: Pt will report worst pain of 3/10 in order to progress toward max functional ability and improve quality of life. [] Progressing  [x] Met  [] Not Met     FUNCTION: Patient will demonstrate improved function as indicated by a functional score of greater than or equal to 50% predicted on FOTO. [x] Progressing  [] Met  [] Not Met     HEP: Patient will demonstrate independence with HEP in order to progress toward functional independence. [] Progressing  [x] Met  [] Not Met        Long Term Goals:  8 weeks Status Date Met   PAIN: Pt will report worst pain of 1/10 in order to progress toward max functional ability and improve quality of life [x] Progressing  [] Met  [] Not Met     FUNCTION: Patient will demonstrate improved function as indicated by a functional score of greater than or equal to predicted score on FOTO. [x] Progressing  [] Met  [] Not Met     MOBILITY: Patient will improve AROM to stated goals, listed in objective measures above, in  order to return to maximal functional potential and improve quality of life.  [x] Progressing  [] Met  [] Not Met     STRENGTH: Patient will improve strength to stated goals, listed in objective measures above, in order to improve functional independence and quality of life.  [x] Progressing  [] Met  [] Not Met     GAIT: Patient will demonstrate normalized gait mechanics with minimal compensation in order to return to PLOF. [x] Progressing  [] Met  [] Not Met     Patient will return to normal ADL's, IADL's, community involvement, recreational activities, and work-related activities with less than or equal to 1/10 pain and maximal function.  [x] Progressing  [] Met  [] Not Met          Tisha West, SPT

## 2025-07-31 ENCOUNTER — CLINICAL SUPPORT (OUTPATIENT)
Dept: REHABILITATION | Facility: HOSPITAL | Age: 75
End: 2025-07-31
Payer: MEDICARE

## 2025-07-31 DIAGNOSIS — M25.561 ACUTE PAIN OF RIGHT KNEE: Primary | ICD-10-CM

## 2025-07-31 PROCEDURE — 97110 THERAPEUTIC EXERCISES: CPT | Mod: PN

## 2025-07-31 PROCEDURE — 97112 NEUROMUSCULAR REEDUCATION: CPT | Mod: PN

## 2025-07-31 NOTE — PROGRESS NOTES
Outpatient Rehab    Physical Therapy Visit    Patient Name: Pat Damon  MRN: [id number]  YOB: 1950  Encounter Date: 7/21/2025    Therapy Diagnosis:   Encounter Diagnosis   Name Primary?    Acute pain of right knee Yes       Physician: Gema León, *    Physician Orders: Eval and Treat  Medical Diagnosis: Closed nondisplaced transverse fracture of right patella, initial encounter  Surgical Diagnosis: Not applicable for this Episode   Surgical Date: Not applicable for this Episode  Days Since Last Surgery: Not applicable for this Episode    Visit # / Visits Authorized:  12 / 20  Insurance Authorization Period: 6/13/2025 to 12/31/2025  Date of Evaluation: 6/12/2025  Plan of Care Certification: 6/12/2025 to 8/8/2025      PT/PTA:     Number of PTA visits since last PT visit:   Time In:  1500  Time Out:  1600  Total Time (in minutes):     Total Billable Time (in minutes):  45 minutes    FOTO:  Intake Score:  %  Survey Score 2:  %  Survey Score 3:  %    Precautions:       Subjective    Patient reports No new complaints.        Pain 0/10    Objective    Objective Measures updated at progress report unless specified.         Treatment:         CPT Intervention  R Knee Duration / Intensity  7/21   MT Manual 10'   TE Recumbent bike      NMR Active heel slides 30x with OP    TE  Prone Quad Str 3x30s   NMR SAQ 3x10   NMR LAQ 3x10   TE Shuttle Squat 3x10 3B   TE Standing 3-way hip 3x10           PLAN             CPT Codes available for Billing:   (10) minutes of Manual therapy (MT) to improve pain and ROM.  (20) minutes of Therapeutic Exercise (TE) to develop strength, endurance, range of motion, and flexibility.  (15) minutes of Neuromuscular Re-Education (NMR)  to improve: Balance, Coordination, Kinesthetic, Sense, Proprioception, and Posture.  (0) minutes of Therapeutic Activities (TA) to improve functional performance.  (-) Unattended Electrical Stimulation (ES) for muscle performance or pain  modulation.  (-) Vasopneumatic Device Therapy () for management of swelling/edema. (45399)  (-) BFR: Blood flow restriction applied during exercise  NP or (-): Not Performed    Time Entry(in minutes):       Assessment & Plan   Assessment:  Progressed ROM without significant complaints. Initiated knee extension AROM against gravity without pain.         The patient will continue to benefit from skilled outpatient physical therapy in order to address the deficits listed in the problem list on the initial evaluation, provide patient and family education, and maximize the patients level of independence in the home and community environments.     The patient's spiritual, cultural, and educational needs were considered, and the patient is agreeable to the plan of care and goals.           Plan:  Continue to progress as tolerated under current POC.      Goals:      Short Term Goals:  4 weeks Status  Date Met   PAIN: Pt will report worst pain of 3/10 in order to progress toward max functional ability and improve quality of life. [] Progressing  [x] Met  [] Not Met     FUNCTION: Patient will demonstrate improved function as indicated by a functional score of greater than or equal to 50% predicted on FOTO. [x] Progressing  [] Met  [] Not Met     HEP: Patient will demonstrate independence with HEP in order to progress toward functional independence. [] Progressing  [x] Met  [] Not Met        Long Term Goals:  8 weeks Status Date Met   PAIN: Pt will report worst pain of 1/10 in order to progress toward max functional ability and improve quality of life [x] Progressing  [] Met  [] Not Met     FUNCTION: Patient will demonstrate improved function as indicated by a functional score of greater than or equal to predicted score on FOTO. [x] Progressing  [] Met  [] Not Met     MOBILITY: Patient will improve AROM to stated goals, listed in objective measures above, in order to return to maximal functional potential and improve  quality of life.  [x] Progressing  [] Met  [] Not Met     STRENGTH: Patient will improve strength to stated goals, listed in objective measures above, in order to improve functional independence and quality of life.  [x] Progressing  [] Met  [] Not Met     GAIT: Patient will demonstrate normalized gait mechanics with minimal compensation in order to return to PLOF. [x] Progressing  [] Met  [] Not Met     Patient will return to normal ADL's, IADL's, community involvement, recreational activities, and work-related activities with less than or equal to 1/10 pain and maximal function.  [x] Progressing  [] Met  [] Not Met          Zoran Bradford, PT

## 2025-08-01 NOTE — PROGRESS NOTES
Outpatient Rehab    Physical Therapy Visit    Patient Name: Pat Damon  MRN: [id number]  YOB: 1950  Encounter Date: 7/24/2025    Therapy Diagnosis:   Encounter Diagnosis   Name Primary?    Acute pain of right knee Yes       Physician: Gema León, *    Physician Orders: Eval and Treat  Medical Diagnosis: Closed nondisplaced transverse fracture of right patella, initial encounter  Surgical Diagnosis: Not applicable for this Episode   Surgical Date: Not applicable for this Episode  Days Since Last Surgery: Not applicable for this Episode    Visit # / Visits Authorized:  12 / 20  Insurance Authorization Period: 6/13/2025 to 12/31/2025  Date of Evaluation: 6/12/2025  Plan of Care Certification: 6/12/2025 to 8/8/2025      PT/PTA:     Number of PTA visits since last PT visit:   Time In:  1430  Time Out:  1530  Total Time (in minutes):     Total Billable Time (in minutes):  45 minutes    FOTO:  Intake Score:  %  Survey Score 2:  %  Survey Score 3:  %    Precautions:       Subjective    Patient reports she was a little sore after last visit but is doing ok.       Pain 0/10    Objective    Objective Measures updated at progress report unless specified.         Treatment:         CPT Intervention  R Knee Duration / Intensity  7/21   MT Manual 10'   TE Recumbent bike   6'   NMR Active heel slides 30x with OP    TE  Prone Quad Str 3x30s   NMR SAQ 3x10 2#   NMR LAQ 3x10  2#   TE Shuttle Squat 3x10 3B   TE Standing 3-way hip 3x10 RTB           PLAN             CPT Codes available for Billing:   (10) minutes of Manual therapy (MT) to improve pain and ROM.  (20) minutes of Therapeutic Exercise (TE) to develop strength, endurance, range of motion, and flexibility.  (15) minutes of Neuromuscular Re-Education (NMR)  to improve: Balance, Coordination, Kinesthetic, Sense, Proprioception, and Posture.  (0) minutes of Therapeutic Activities (TA) to improve functional performance.  (-) Unattended Electrical  Stimulation (ES) for muscle performance or pain modulation.  (-) Vasopneumatic Device Therapy () for management of swelling/edema. (18283)  (-) BFR: Blood flow restriction applied during exercise  NP or (-): Not Performed    Time Entry(in minutes):       Assessment & Plan   Assessment:  Tolerating exercise progressions well.          The patient will continue to benefit from skilled outpatient physical therapy in order to address the deficits listed in the problem list on the initial evaluation, provide patient and family education, and maximize the patients level of independence in the home and community environments.     The patient's spiritual, cultural, and educational needs were considered, and the patient is agreeable to the plan of care and goals.           Plan:  Continue to progress as tolerated under current POC.      Goals:      Short Term Goals:  4 weeks Status  Date Met   PAIN: Pt will report worst pain of 3/10 in order to progress toward max functional ability and improve quality of life. [] Progressing  [x] Met  [] Not Met     FUNCTION: Patient will demonstrate improved function as indicated by a functional score of greater than or equal to 50% predicted on FOTO. [x] Progressing  [] Met  [] Not Met     HEP: Patient will demonstrate independence with HEP in order to progress toward functional independence. [] Progressing  [x] Met  [] Not Met        Long Term Goals:  8 weeks Status Date Met   PAIN: Pt will report worst pain of 1/10 in order to progress toward max functional ability and improve quality of life [x] Progressing  [] Met  [] Not Met     FUNCTION: Patient will demonstrate improved function as indicated by a functional score of greater than or equal to predicted score on FOTO. [x] Progressing  [] Met  [] Not Met     MOBILITY: Patient will improve AROM to stated goals, listed in objective measures above, in order to return to maximal functional potential and improve quality of life.  [x]  Progressing  [] Met  [] Not Met     STRENGTH: Patient will improve strength to stated goals, listed in objective measures above, in order to improve functional independence and quality of life.  [x] Progressing  [] Met  [] Not Met     GAIT: Patient will demonstrate normalized gait mechanics with minimal compensation in order to return to PLOF. [x] Progressing  [] Met  [] Not Met     Patient will return to normal ADL's, IADL's, community involvement, recreational activities, and work-related activities with less than or equal to 1/10 pain and maximal function.  [x] Progressing  [] Met  [] Not Met          Zoran Bradford, PT

## 2025-08-04 ENCOUNTER — CLINICAL SUPPORT (OUTPATIENT)
Dept: REHABILITATION | Facility: HOSPITAL | Age: 75
End: 2025-08-04
Payer: MEDICARE

## 2025-08-04 DIAGNOSIS — M25.561 ACUTE PAIN OF RIGHT KNEE: Primary | ICD-10-CM

## 2025-08-04 PROCEDURE — 97110 THERAPEUTIC EXERCISES: CPT | Mod: PN

## 2025-08-04 PROCEDURE — 97112 NEUROMUSCULAR REEDUCATION: CPT | Mod: PN

## 2025-08-08 NOTE — PROGRESS NOTES
Outpatient Rehab    Physical Therapy Visit    Patient Name: Pat Damon  MRN: [id number]  YOB: 1950  Encounter Date: 7/31/2025    Therapy Diagnosis:   Encounter Diagnosis   Name Primary?    Acute pain of right knee Yes       Physician: Gema León, *    Physician Orders: Eval and Treat  Medical Diagnosis: Closed nondisplaced transverse fracture of right patella, initial encounter  Surgical Diagnosis: Not applicable for this Episode   Surgical Date: Not applicable for this Episode  Days Since Last Surgery: Not applicable for this Episode    Visit # / Visits Authorized:  13 / 20  Insurance Authorization Period: 6/13/2025 to 12/31/2025  Date of Evaluation: 6/12/2025  Plan of Care Certification: 6/12/2025 to 8/8/2025     Time In:  1430  Time Out:  1530  Total Time (in minutes):    Total Billable Time (in minutes):  45 minutes     Subjective    Patient reports soreness of R quadriceps but pain 0/10 at rest. Pain typically arises each morning and it gets to a 5/10 max. Pt wears a compression sleeve to relieve pain. She has been riding her recumbent bike at home for 5-10 minutes at a time.    Objective    Objective Measures updated at progress report unless specified.         Treatment:      CPT Intervention  R Knee Duration / Intensity  7/31   MT Manual -   TE Recumbent bike  6'   NMR Active heel slides 30x with OP   TE  Prone Quad Str 3x30s   NMR SAQ 3x10 3#   NMR LAQ 3x10 3#   TE HS Curl 3x10 3#   TE Shuttle Squat 3X10 3B (Increase next)   TE Standing 3-way hip 3x10 RTB  (Increase next)   NMR SL Balance 5x30s           PLAN             CPT Codes available for Billing:   (0) minutes of Manual therapy (MT) to improve pain and ROM.  (25) minutes of Therapeutic Exercise (TE) to develop strength, endurance, range of motion, and flexibility.  (20) minutes of Neuromuscular Re-Education (NMR)  to improve: Balance, Coordination, Kinesthetic, Sense, Proprioception, and Posture.  (0) minutes of Therapeutic  Activities (TA) to improve functional performance.  (-) Unattended Electrical Stimulation (ES) for muscle performance or pain modulation.  (-) Vasopneumatic Device Therapy () for management of swelling/edema. (74799)  (-) BFR: Blood flow restriction applied during exercise  NP or (-): Not Performed    Time Entry(in minutes): 45 minutes     Assessment & Plan   Assessment:  Patient tolerated session well.         The patient will continue to benefit from skilled outpatient physical therapy in order to address the deficits listed in the problem list on the initial evaluation, provide patient and family education, and maximize the patients level of independence in the home and community environments.     The patient's spiritual, cultural, and educational needs were considered, and the patient is agreeable to the plan of care and goals.        Plan:  Continue to progress as tolerated under current POC.    Goals:      Short Term Goals:  4 weeks Status  Date Met   PAIN: Pt will report worst pain of 3/10 in order to progress toward max functional ability and improve quality of life. [] Progressing  [x] Met  [] Not Met     FUNCTION: Patient will demonstrate improved function as indicated by a functional score of greater than or equal to 50% predicted on FOTO. [x] Progressing  [] Met  [] Not Met     HEP: Patient will demonstrate independence with HEP in order to progress toward functional independence. [] Progressing  [x] Met  [] Not Met        Long Term Goals:  8 weeks Status Date Met   PAIN: Pt will report worst pain of 1/10 in order to progress toward max functional ability and improve quality of life [x] Progressing  [] Met  [] Not Met     FUNCTION: Patient will demonstrate improved function as indicated by a functional score of greater than or equal to predicted score on FOTO. [x] Progressing  [] Met  [] Not Met     MOBILITY: Patient will improve AROM to stated goals, listed in objective measures above, in order to  return to maximal functional potential and improve quality of life.  [x] Progressing  [] Met  [] Not Met     STRENGTH: Patient will improve strength to stated goals, listed in objective measures above, in order to improve functional independence and quality of life.  [x] Progressing  [] Met  [] Not Met     GAIT: Patient will demonstrate normalized gait mechanics with minimal compensation in order to return to PLOF. [x] Progressing  [] Met  [] Not Met     Patient will return to normal ADL's, IADL's, community involvement, recreational activities, and work-related activities with less than or equal to 1/10 pain and maximal function.  [x] Progressing  [] Met  [] Not Met          Zoran Bradford, PT

## 2025-08-13 DIAGNOSIS — K76.89 FLOATING LIVER: Primary | ICD-10-CM

## 2025-08-14 ENCOUNTER — HOSPITAL ENCOUNTER (OUTPATIENT)
Dept: RADIOLOGY | Facility: HOSPITAL | Age: 75
Discharge: HOME | End: 2025-08-14
Attending: FAMILY MEDICINE
Payer: MEDICARE

## 2025-08-14 DIAGNOSIS — K76.89 FLOATING LIVER: ICD-10-CM

## 2025-08-14 PROCEDURE — 76700 US EXAM ABDOM COMPLETE: CPT | Mod: 26,,, | Performed by: RADIOLOGY

## 2025-08-14 PROCEDURE — 76700 US EXAM ABDOM COMPLETE: CPT | Mod: TC

## 2025-08-25 ENCOUNTER — HOSPITAL ENCOUNTER (OUTPATIENT)
Dept: RADIOLOGY | Facility: HOSPITAL | Age: 75
Discharge: HOME | End: 2025-08-25
Attending: ORTHOPAEDIC SURGERY
Payer: MEDICARE

## 2025-08-25 ENCOUNTER — OFFICE VISIT (OUTPATIENT)
Dept: SPORTS MEDICINE | Facility: CLINIC | Age: 75
End: 2025-08-25
Payer: MEDICARE

## 2025-08-25 VITALS — BODY MASS INDEX: 24.64 KG/M2 | HEIGHT: 67 IN | WEIGHT: 157 LBS

## 2025-08-25 DIAGNOSIS — M25.561 RIGHT KNEE PAIN, UNSPECIFIED CHRONICITY: ICD-10-CM

## 2025-08-25 DIAGNOSIS — M25.561 RIGHT KNEE PAIN, UNSPECIFIED CHRONICITY: Primary | ICD-10-CM

## 2025-08-25 DIAGNOSIS — S82.091A OTHER CLOSED FRACTURE OF RIGHT PATELLA, INITIAL ENCOUNTER: ICD-10-CM

## 2025-08-25 PROCEDURE — 99213 OFFICE O/P EST LOW 20 MIN: CPT | Mod: PBBFAC,25,PN | Performed by: ORTHOPAEDIC SURGERY

## 2025-08-25 PROCEDURE — 99999 PR PBB SHADOW E&M-EST. PATIENT-LVL III: CPT | Mod: PBBFAC,,, | Performed by: ORTHOPAEDIC SURGERY

## 2025-08-25 PROCEDURE — 99214 OFFICE O/P EST MOD 30 MIN: CPT | Mod: S$PBB,,, | Performed by: ORTHOPAEDIC SURGERY

## 2025-08-25 PROCEDURE — 73564 X-RAY EXAM KNEE 4 OR MORE: CPT | Mod: 26,RT,, | Performed by: RADIOLOGY

## 2025-08-25 PROCEDURE — 73562 X-RAY EXAM OF KNEE 3: CPT | Mod: 26,LT,, | Performed by: RADIOLOGY

## 2025-08-25 PROCEDURE — 73564 X-RAY EXAM KNEE 4 OR MORE: CPT | Mod: TC,PN,RT

## 2025-08-25 RX ORDER — BLOOD PRESSURE TEST KIT-WRIST
400 KIT MISCELLANEOUS DAILY
COMMUNITY